# Patient Record
Sex: MALE | Race: BLACK OR AFRICAN AMERICAN | Employment: FULL TIME | URBAN - METROPOLITAN AREA
[De-identification: names, ages, dates, MRNs, and addresses within clinical notes are randomized per-mention and may not be internally consistent; named-entity substitution may affect disease eponyms.]

---

## 2017-01-12 ENCOUNTER — OFFICE VISIT (OUTPATIENT)
Dept: FAMILY MEDICINE CLINIC | Age: 62
End: 2017-01-12

## 2017-01-12 VITALS
DIASTOLIC BLOOD PRESSURE: 88 MMHG | OXYGEN SATURATION: 97 % | SYSTOLIC BLOOD PRESSURE: 147 MMHG | HEIGHT: 64 IN | RESPIRATION RATE: 18 BRPM | HEART RATE: 65 BPM | TEMPERATURE: 96.9 F | WEIGHT: 178.8 LBS | BODY MASS INDEX: 30.52 KG/M2

## 2017-01-12 DIAGNOSIS — Z11.3 SCREEN FOR STD (SEXUALLY TRANSMITTED DISEASE): ICD-10-CM

## 2017-01-12 DIAGNOSIS — R04.0 FREQUENT NOSEBLEEDS: ICD-10-CM

## 2017-01-12 DIAGNOSIS — I10 ESSENTIAL HYPERTENSION: Primary | ICD-10-CM

## 2017-01-12 DIAGNOSIS — E78.00 HIGH CHOLESTEROL: ICD-10-CM

## 2017-01-12 NOTE — PROGRESS NOTES
HISTORY OF PRESENT ILLNESS  Shani Quintero is a 59 y.o. male. HPI Pt. Comes in for blood pressure and cholesterol check. Has been having some problems with recurrent nose bleeds, saw Dr. Jewel Torres of ENT- had silver nitrate applied. This helped for one week. ROS    Physical Exam   Constitutional: He appears well-developed and well-nourished. HENT:   Right Ear: External ear normal.   Left Ear: External ear normal.   Mouth/Throat: Oropharynx is clear and moist.   Neck: No thyromegaly present. Cardiovascular: Normal rate, regular rhythm, normal heart sounds and intact distal pulses. Pulmonary/Chest: Effort normal and breath sounds normal. No respiratory distress. He has no wheezes. Abdominal: Soft. Bowel sounds are normal. He exhibits no distension and no mass. There is no tenderness. There is no guarding. Musculoskeletal: Normal range of motion. He exhibits no edema. Lymphadenopathy:     He has no cervical adenopathy. Nursing note and vitals reviewed. ASSESSMENT and PLAN  Orders Placed This Encounter    LIPID PANEL    METABOLIC PANEL, COMPREHENSIVE    HIV 1/2 AB SCREEN Hans Dumont was seen today for cholesterol problem and hypertension. Diagnoses and all orders for this visit:    Essential hypertension  -     Cancel: CBC WITH AUTOMATED DIFF  -     METABOLIC PANEL, COMPREHENSIVE    High cholesterol  -     LIPID PANEL    Frequent nosebleeds    Screen for STD (sexually transmitted disease)  -     HIV 1/2 AB SCREEN W RFLX CONFIRM      Follow-up Disposition:  Return in about 6 months (around 7/12/2017).

## 2017-01-12 NOTE — PROGRESS NOTES
Chief Complaint   Patient presents with    Cholesterol Problem    Hypertension     Pt here for 6 month follow up. Pt went to ENT in December for nose bleeds. Given nitrate to stop bleeding , only lasted a few days then nose bleeds started again. Pt reports he is supposed to go back again. Pt reported having colonoscopy done in September ,2016.

## 2017-01-12 NOTE — MR AVS SNAPSHOT
Visit Information Date & Time Provider Department Dept. Phone Encounter #  
 1/12/2017 10:45 AM Concepción Ambrose MD [facility] [id number] Upcoming Health Maintenance Date Due COLONOSCOPY 9/16/2015 DTaP/Tdap/Td series (2 - Td) 12/18/2024 Allergies as of 1/12/2017  Review Complete On: 1/12/2017 By: Kristen Odonnell LPN Severity Noted Reaction Type Reactions Other Medication  03/26/2010    Other (comments) oxepam  Patient states he doesn't know what this is. Current Immunizations  Reviewed on 12/18/2014 Name Date Tdap 12/18/2014 Not reviewed this visit You Were Diagnosed With   
  
 Codes Comments Essential hypertension    -  Primary ICD-10-CM: I10 
ICD-9-CM: 401.9 High cholesterol     ICD-10-CM: E78.00 ICD-9-CM: 272.0 Frequent nosebleeds     ICD-10-CM: R04.0 ICD-9-CM: 784.7 Screen for STD (sexually transmitted disease)     ICD-10-CM: Z11.3 ICD-9-CM: V74.5 Vitals BP Pulse Temp Resp Height(growth percentile) Weight(growth percentile) 147/88 65 96.9 °F (36.1 °C) (Oral) 18 5' 4\" (1.626 m) 178 lb 12.8 oz (81.1 kg) SpO2 BMI Smoking Status 97% 30.69 kg/m2 Former Smoker BMI and BSA Data Body Mass Index Body Surface Area  
 30.69 kg/m 2 1.91 m 2 Preferred Pharmacy Pharmacy Name Phone [facility] [address] [phone] Your Updated Medication List  
  
   
This list is accurate as of: 1/12/17 11:20 AM.  Always use your most recent med list.  
  
  
  
  
 ALPRAZolam 1 mg tablet Commonly known as:  XANAX  
TAKE 1 TABLET BY MOUTH TWICE DAILY  
  
 hydroCHLOROthiazide 25 mg tablet Commonly known as:  HYDRODIURIL  
TAKE 1 TABLET BY MOUTH DAILY FOR BLOOD PRESSURE  
  
 naproxen 500 mg tablet Commonly known as:  NAPROSYN  
 TAKE 1 TABLET BY MOUTH 3 times  DAILY WITH FOOD FOR HIP PAIN  
  
 nisoldipine 40 mg SR tablet TAKE 1 TABLET BY MOUTH DAILY FOR BLOOD PRESSURE potassium chloride 20 mEq tablet Commonly known as:  K-DUR, KLOR-CON  
TAKE 1 TABLET BY MOUTH TWICE DAILY  
  
 simvastatin 10 mg tablet Commonly known as:  ZOCOR  
TAKE 1 TABLET BY MOUTH AT BEDTIME FOR CHOLESTEROL  
  
 traMADol-acetaminophen 37.5-325 mg per tablet Commonly known as:  ULTRACET TAKE 2 TABLETS BY MOUTH EVERY 6 HOURS AS NEEDED FOR PAIN  
  
 triamcinolone acetonide 0.1 % topical cream  
Commonly known as:  KENALOG  
APPLY A THIN LAYER TO THE AFFECTED AREA TWICE DAILY We Performed the Following HIV 1/2 Delcia Abbot RFLX CONFIRM [75166 CPT(R)] LIPID PANEL [51851 CPT(R)] METABOLIC PANEL, COMPREHENSIVE [48486 CPT(R)] Introducing [facility] & HEALTH SERVICES! Dear Dutch Francis: Thank you for requesting a [employer] account. Our records indicate that you have previously registered for a [employer] account but its currently inactive. Please call our [employer] support line at [phone]. Additional Information If you have questions, please visit the Frequently Asked Questions section of the [employer] website at [email or web address] Remember, [employer] is NOT to be used for urgent needs. For medical emergencies, dial 911. Now available from your iPhone and Android! Please provide this summary of care documentation to your next provider. Your primary care clinician is listed as Dank Paredes. If you have any questions after today's visit, please call [phone].

## 2017-01-13 LAB
ALBUMIN SERPL-MCNC: 4.8 G/DL (ref 3.6–4.8)
ALBUMIN/GLOB SERPL: 1.7 {RATIO} (ref 1.1–2.5)
ALP SERPL-CCNC: 63 IU/L (ref 39–117)
ALT SERPL-CCNC: 29 IU/L (ref 0–44)
AST SERPL-CCNC: 28 IU/L (ref 0–40)
BILIRUB SERPL-MCNC: 0.4 MG/DL (ref 0–1.2)
BUN SERPL-MCNC: 13 MG/DL (ref 8–27)
BUN/CREAT SERPL: 13 (ref 10–22)
CALCIUM SERPL-MCNC: 9.6 MG/DL (ref 8.6–10.2)
CHLORIDE SERPL-SCNC: 101 MMOL/L (ref 96–106)
CHOLEST SERPL-MCNC: 152 MG/DL (ref 100–199)
CO2 SERPL-SCNC: 27 MMOL/L (ref 18–29)
CREAT SERPL-MCNC: 1.03 MG/DL (ref 0.76–1.27)
GLOBULIN SER CALC-MCNC: 2.8 G/DL (ref 1.5–4.5)
GLUCOSE SERPL-MCNC: 95 MG/DL (ref 65–99)
HDLC SERPL-MCNC: 52 MG/DL
INTERPRETATION, 910389: NORMAL
LDLC SERPL CALC-MCNC: 90 MG/DL (ref 0–99)
POTASSIUM SERPL-SCNC: 4.2 MMOL/L (ref 3.5–5.2)
PROT SERPL-MCNC: 7.6 G/DL (ref 6–8.5)
SODIUM SERPL-SCNC: 142 MMOL/L (ref 134–144)
TRIGL SERPL-MCNC: 52 MG/DL (ref 0–149)
VLDLC SERPL CALC-MCNC: 10 MG/DL (ref 5–40)

## 2017-02-02 RX ORDER — ALPRAZOLAM 1 MG/1
TABLET ORAL
Qty: 60 TAB | Refills: 0 | Status: SHIPPED | OUTPATIENT
Start: 2017-02-02 | End: 2017-03-03 | Stop reason: SDUPTHER

## 2017-02-03 ENCOUNTER — DOCUMENTATION ONLY (OUTPATIENT)
Dept: FAMILY MEDICINE CLINIC | Age: 62
End: 2017-02-03

## 2017-02-07 RX ORDER — TRAMADOL HYDROCHLORIDE AND ACETAMINOPHEN 37.5; 325 MG/1; MG/1
TABLET ORAL
Qty: 240 TAB | Refills: 0 | Status: SHIPPED | OUTPATIENT
Start: 2017-02-07 | End: 2017-03-23 | Stop reason: SDUPTHER

## 2017-02-08 ENCOUNTER — TELEPHONE (OUTPATIENT)
Dept: FAMILY MEDICINE CLINIC | Age: 62
End: 2017-02-08

## 2017-03-02 RX ORDER — SIMVASTATIN 10 MG/1
TABLET, FILM COATED ORAL
Qty: 90 TAB | Refills: 0 | Status: SHIPPED | OUTPATIENT
Start: 2017-03-02 | End: 2017-07-12 | Stop reason: SDUPTHER

## 2017-03-02 RX ORDER — HYDROCHLOROTHIAZIDE 25 MG/1
TABLET ORAL
Qty: 90 TAB | Refills: 0 | Status: SHIPPED | OUTPATIENT
Start: 2017-03-02 | End: 2017-07-12 | Stop reason: SDUPTHER

## 2017-03-03 RX ORDER — ALPRAZOLAM 1 MG/1
TABLET ORAL
Qty: 60 TAB | Refills: 0 | Status: SHIPPED | OUTPATIENT
Start: 2017-03-03 | End: 2017-03-06 | Stop reason: SDUPTHER

## 2017-03-06 ENCOUNTER — DOCUMENTATION ONLY (OUTPATIENT)
Dept: FAMILY MEDICINE CLINIC | Age: 62
End: 2017-03-06

## 2017-03-06 RX ORDER — ALPRAZOLAM 1 MG/1
TABLET ORAL
Qty: 60 TAB | Refills: 0 | Status: SHIPPED | OUTPATIENT
Start: 2017-03-06 | End: 2017-05-01 | Stop reason: SDUPTHER

## 2017-03-07 ENCOUNTER — TELEPHONE (OUTPATIENT)
Dept: FAMILY MEDICINE CLINIC | Age: 62
End: 2017-03-07

## 2017-03-09 RX ORDER — POTASSIUM CHLORIDE 20 MEQ/1
TABLET, EXTENDED RELEASE ORAL
Qty: 60 TAB | Refills: 0 | Status: SHIPPED | OUTPATIENT
Start: 2017-03-09 | End: 2017-07-12 | Stop reason: SDUPTHER

## 2017-03-23 RX ORDER — TRAMADOL HYDROCHLORIDE AND ACETAMINOPHEN 37.5; 325 MG/1; MG/1
TABLET ORAL
Qty: 240 TAB | Refills: 0 | Status: SHIPPED | OUTPATIENT
Start: 2017-03-23 | End: 2017-05-02 | Stop reason: SDUPTHER

## 2017-03-27 ENCOUNTER — TELEPHONE (OUTPATIENT)
Dept: FAMILY MEDICINE CLINIC | Age: 62
End: 2017-03-27

## 2017-03-27 NOTE — TELEPHONE ENCOUNTER
Patient wants to get the medication for traMADol-acetaminophen (ULTRACET) 37.5-325 mg per tablet .  If any questions please give him a call @ [phone]

## 2017-04-06 RX ORDER — POTASSIUM CHLORIDE 20 MEQ/1
TABLET, EXTENDED RELEASE ORAL
Qty: 60 TAB | Refills: 0 | Status: SHIPPED | OUTPATIENT
Start: 2017-04-06 | End: 2017-07-12 | Stop reason: SDUPTHER

## 2017-05-01 RX ORDER — ALPRAZOLAM 1 MG/1
TABLET ORAL
Qty: 60 TAB | Refills: 0 | Status: SHIPPED | OUTPATIENT
Start: 2017-05-01 | End: 2017-05-02 | Stop reason: SDUPTHER

## 2017-05-02 ENCOUNTER — DOCUMENTATION ONLY (OUTPATIENT)
Dept: FAMILY MEDICINE CLINIC | Age: 62
End: 2017-05-02

## 2017-05-02 RX ORDER — TRAMADOL HYDROCHLORIDE AND ACETAMINOPHEN 37.5; 325 MG/1; MG/1
TABLET ORAL
Qty: 240 TAB | Refills: 0 | Status: SHIPPED | OUTPATIENT
Start: 2017-05-02 | End: 2017-06-20 | Stop reason: SDUPTHER

## 2017-05-02 RX ORDER — ALPRAZOLAM 1 MG/1
TABLET ORAL
Qty: 60 TAB | Refills: 0 | Status: SHIPPED | OUTPATIENT
Start: 2017-05-02 | End: 2017-07-02 | Stop reason: SDUPTHER

## 2017-05-08 RX ORDER — POTASSIUM CHLORIDE 20 MEQ/1
TABLET, EXTENDED RELEASE ORAL
Qty: 60 TAB | Refills: 0 | Status: SHIPPED | OUTPATIENT
Start: 2017-05-08 | End: 2017-07-12 | Stop reason: ALTCHOICE

## 2017-05-30 RX ORDER — SIMVASTATIN 10 MG/1
TABLET, FILM COATED ORAL
Qty: 90 TAB | Refills: 0 | Status: SHIPPED | OUTPATIENT
Start: 2017-05-30 | End: 2017-07-12 | Stop reason: SDUPTHER

## 2017-05-30 RX ORDER — HYDROCHLOROTHIAZIDE 25 MG/1
TABLET ORAL
Qty: 90 TAB | Refills: 0 | Status: SHIPPED | OUTPATIENT
Start: 2017-05-30 | End: 2017-07-12 | Stop reason: SDUPTHER

## 2017-06-16 RX ORDER — POTASSIUM CHLORIDE 20 MEQ/1
TABLET, EXTENDED RELEASE ORAL
Qty: 60 TAB | Refills: 0 | Status: SHIPPED | OUTPATIENT
Start: 2017-06-16 | End: 2017-07-12 | Stop reason: SDUPTHER

## 2017-06-20 RX ORDER — TRAMADOL HYDROCHLORIDE AND ACETAMINOPHEN 37.5; 325 MG/1; MG/1
TABLET ORAL
Qty: 240 TAB | Refills: 0 | Status: SHIPPED | OUTPATIENT
Start: 2017-06-20 | End: 2017-07-12 | Stop reason: ALTCHOICE

## 2017-06-20 RX ORDER — POTASSIUM CHLORIDE 20 MEQ/1
TABLET, EXTENDED RELEASE ORAL
Qty: 60 TAB | Refills: 0 | Status: SHIPPED | OUTPATIENT
Start: 2017-06-20 | End: 2017-07-12 | Stop reason: SDUPTHER

## 2017-06-22 ENCOUNTER — TELEPHONE (OUTPATIENT)
Dept: FAMILY MEDICINE CLINIC | Age: 62
End: 2017-06-22

## 2017-07-03 RX ORDER — ALPRAZOLAM 1 MG/1
TABLET ORAL
Qty: 60 TAB | Refills: 0 | Status: SHIPPED | OUTPATIENT
Start: 2017-07-03 | End: 2017-07-04 | Stop reason: SDUPTHER

## 2017-07-05 ENCOUNTER — TELEPHONE (OUTPATIENT)
Dept: FAMILY MEDICINE CLINIC | Age: 62
End: 2017-07-05

## 2017-07-05 RX ORDER — ALPRAZOLAM 1 MG/1
TABLET ORAL
Qty: 60 TAB | Refills: 0 | Status: SHIPPED | OUTPATIENT
Start: 2017-07-05 | End: 2017-07-12 | Stop reason: SDUPTHER

## 2017-07-06 ENCOUNTER — TELEPHONE (OUTPATIENT)
Dept: FAMILY MEDICINE CLINIC | Age: 62
End: 2017-07-06

## 2017-07-12 ENCOUNTER — OFFICE VISIT (OUTPATIENT)
Dept: FAMILY MEDICINE CLINIC | Age: 62
End: 2017-07-12

## 2017-07-12 VITALS
OXYGEN SATURATION: 98 % | SYSTOLIC BLOOD PRESSURE: 143 MMHG | DIASTOLIC BLOOD PRESSURE: 72 MMHG | HEART RATE: 65 BPM | HEIGHT: 64 IN | BODY MASS INDEX: 28.79 KG/M2 | WEIGHT: 168.6 LBS | TEMPERATURE: 98.6 F | RESPIRATION RATE: 14 BRPM

## 2017-07-12 DIAGNOSIS — I10 ESSENTIAL HYPERTENSION: ICD-10-CM

## 2017-07-12 DIAGNOSIS — Z11.4 SCREENING FOR HIV (HUMAN IMMUNODEFICIENCY VIRUS): ICD-10-CM

## 2017-07-12 DIAGNOSIS — Z12.5 PROSTATE CANCER SCREENING: ICD-10-CM

## 2017-07-12 DIAGNOSIS — E78.00 HIGH CHOLESTEROL: Primary | ICD-10-CM

## 2017-07-12 RX ORDER — TRIAMCINOLONE ACETONIDE 1 MG/G
CREAM TOPICAL
Qty: 80 G | Refills: 0 | Status: CANCELLED | OUTPATIENT
Start: 2017-07-12

## 2017-07-12 RX ORDER — ALPRAZOLAM 1 MG/1
TABLET ORAL
Qty: 60 TAB | Refills: 5 | Status: SHIPPED | OUTPATIENT
Start: 2017-07-12 | End: 2018-01-08 | Stop reason: SDUPTHER

## 2017-07-12 RX ORDER — SIMVASTATIN 10 MG/1
TABLET, FILM COATED ORAL
Qty: 90 TAB | Refills: 3 | Status: SHIPPED | OUTPATIENT
Start: 2017-07-12 | End: 2018-01-15 | Stop reason: SDUPTHER

## 2017-07-12 RX ORDER — HYDROCHLOROTHIAZIDE 25 MG/1
TABLET ORAL
Qty: 90 TAB | Refills: 3 | Status: SHIPPED | OUTPATIENT
Start: 2017-07-12 | End: 2018-08-08 | Stop reason: SDUPTHER

## 2017-07-12 RX ORDER — POTASSIUM CHLORIDE 20 MEQ/1
TABLET, EXTENDED RELEASE ORAL
Qty: 60 TAB | Refills: 12 | Status: SHIPPED | OUTPATIENT
Start: 2017-07-12 | End: 2018-01-08 | Stop reason: SDUPTHER

## 2017-07-12 RX ORDER — TRAMADOL HYDROCHLORIDE AND ACETAMINOPHEN 37.5; 325 MG/1; MG/1
TABLET ORAL
Qty: 240 TAB | Refills: 0 | Status: CANCELLED | OUTPATIENT
Start: 2017-07-12

## 2017-07-12 RX ORDER — DEXTROMETHORPHAN HYDROBROMIDE, GUAIFENESIN 5; 100 MG/5ML; MG/5ML
LIQUID ORAL
Qty: 120 TAB | Refills: 5
Start: 2017-07-12 | End: 2018-01-08

## 2017-07-12 NOTE — MR AVS SNAPSHOT
Visit Information Date & Time Provider Department Dept. Phone Encounter #  
 7/12/2017  9:30 AM Candance Ngo, MD [facility] [phone] [id number] Follow-up Instructions Return in about 6 months (around 1/12/2018). Upcoming Health Maintenance Date Due COLONOSCOPY 9/16/2015 INFLUENZA AGE 9 TO ADULT 8/1/2017 DTaP/Tdap/Td series (2 - Td) 12/18/2024 Allergies as of 7/12/2017  Review Complete On: 7/12/2017 By: Candance Ngo, MD  
  
 Severity Noted Reaction Type Reactions Naproxen  07/12/2017    Other (comments)  
 nosebleeds Other Medication  03/26/2010    Other (comments) oxepam  Patient states he doesn't know what this is. Current Immunizations  Reviewed on 12/18/2014 Name Date Tdap 12/18/2014 Not reviewed this visit You Were Diagnosed With   
  
 Codes Comments High cholesterol    -  Primary ICD-10-CM: E78.00 ICD-9-CM: 272.0 Prostate cancer screening     ICD-10-CM: Z12.5 ICD-9-CM: V76.44 Screening for HIV (human immunodeficiency virus)     ICD-10-CM: Z11.4 ICD-9-CM: V73.89 Essential hypertension     ICD-10-CM: I10 
ICD-9-CM: 401.9 Vitals BP Pulse Temp Resp Height(growth percentile) Weight(growth percentile) 143/72 65 98.6 °F (37 °C) (Oral) 14 5' 4\" (1.626 m) 168 lb 9.6 oz (76.5 kg) SpO2 BMI Smoking Status 98% 28.94 kg/m2 Former Smoker Vitals History BMI and BSA Data Body Mass Index Body Surface Area  
 28.94 kg/m 2 1.86 m 2 Preferred Pharmacy Pharmacy Name Phone [facility] [address] [phone] Your Updated Medication List  
  
   
This list is accurate as of: 7/12/17 10:42 AM.  Always use your most recent med list.  
  
  
  
  
 acetaminophen 650 mg CR tablet Commonly known as:  TYLENOL ARTHRITIS PAIN  
2 tabs po twice daily for pain ALPRAZolam 1 mg tablet Commonly known as:  XANAX  
TAKE 1 TABLET BY MOUTH TWICE DAILY  
  
 hydroCHLOROthiazide 25 mg tablet Commonly known as:  HYDRODIURIL  
TAKE 1 TABLET BY MOUTH DAILY FOR BLOOD PRESSURE  
  
 nisoldipine 40 mg SR tablet TAKE 1 TABLET BY MOUTH DAILY FOR BLOOD PRESSURE potassium chloride 20 mEq tablet Commonly known as:  K-DUR, KLOR-CON  
TAKE 1 TABLET BY MOUTH TWICE DAILY  
  
 simvastatin 10 mg tablet Commonly known as:  ZOCOR  
TAKE 1 TABLET BY MOUTH AT BEDTIME FOR CHOLESTEROL  
  
 triamcinolone acetonide 0.1 % topical cream  
Commonly known as:  KENALOG  
APPLY A THIN LAYER TO THE AFFECTED AREA TWICE DAILY Prescriptions Printed Refills ALPRAZolam (XANAX) 1 mg tablet 5 Sig: TAKE 1 TABLET BY MOUTH TWICE DAILY Class: Print Prescriptions Sent to Pharmacy Refills  
 simvastatin (ZOCOR) 10 mg tablet 3 Sig: TAKE 1 TABLET BY MOUTH AT BEDTIME FOR CHOLESTEROL Class: Normal  
 Pharmacy: [address] Ph #: [phone]  
 potassium chloride (K-DUR, KLOR-CON) 20 mEq tablet 12 Sig: TAKE 1 TABLET BY MOUTH TWICE DAILY Class: Normal  
 Pharmacy: [address] Ph #: [phone]  
 hydroCHLOROthiazide (HYDRODIURIL) 25 mg tablet 3 Sig: TAKE 1 TABLET BY MOUTH DAILY FOR BLOOD PRESSURE Class: Normal  
 Pharmacy: [address] Ph #: [phone] We Performed the Following CBC WITH AUTOMATED DIFF [56336 CPT(R)] HIV 1/2 AG/AB, 4TH GENERATION,W RFLX CONFIRM [[id number] Custom] LIPID PANEL [49791 CPT(R)] METABOLIC PANEL, COMPREHENSIVE [95596 CPT(R)] PROSTATE SPECIFIC AG (PSA) [id number] CPT(R)] Follow-up Instructions Return in about 6 months (around 1/12/2018). Introducing [facility] & HEALTH SERVICES! Dear Zeke Reich: Thank you for requesting a [employer] account. Our records indicate that you have previously registered for a [employer] account but its currently inactive. Please call our [employer] support line at [phone]. Additional Information If you have questions, please visit the Frequently Asked Questions section of the [employer] website at [email or web address] Remember, [employer] is NOT to be used for urgent needs. For medical emergencies, dial 911. Now available from your iPhone and Android! Please provide this summary of care documentation to your next provider. Your primary care clinician is listed as Janette Reinoso. If you have any questions after today's visit, please call [phone].

## 2017-07-12 NOTE — PROGRESS NOTES
Chief Complaint   Patient presents with    Hypertension    Cholesterol Problem     1. Have you been to the ER, urgent care clinic since your last visit? Hospitalized since your last visit? No    2. Have you seen or consulted any other health care providers outside of the [address] [place] [address] since your last visit? Include any pap smears or colon screening.  No   Health Maintenance Due   Topic Date Due    COLONOSCOPY  09/16/2015

## 2017-07-12 NOTE — PROGRESS NOTES
HISTORY OF PRESENT ILLNESS  Connie Reed is a 57 y.o. male. HPI Pt. Comes in for blood pressure and cholesterol check. Still working. No complaints of chest pain, shortness of breath, TIAs, claudication or edema. ROS    Physical Exam   Constitutional: He appears well-developed and well-nourished. HENT:   Right Ear: External ear normal.   Left Ear: External ear normal.   Mouth/Throat: Oropharynx is clear and moist.   Neck: No thyromegaly present. Cardiovascular: Normal rate, regular rhythm, normal heart sounds and intact distal pulses. Pulmonary/Chest: Effort normal and breath sounds normal. No respiratory distress. He has no wheezes. Abdominal: Soft. Bowel sounds are normal. He exhibits no distension and no mass. There is no tenderness. There is no guarding. Musculoskeletal: Normal range of motion. He exhibits no edema. Lymphadenopathy:     He has no cervical adenopathy. Nursing note and vitals reviewed. ASSESSMENT and PLAN  Orders Placed This Encounter    CBC WITH AUTOMATED DIFF    METABOLIC PANEL, COMPREHENSIVE    LIPID PANEL    PROSTATE SPECIFIC AG    HIV 1/2 AG/AB, 4TH GENERATION,W RFLX CONFIRM    simvastatin (ZOCOR) 10 mg tablet    potassium chloride (K-DUR, KLOR-CON) 20 mEq tablet    hydroCHLOROthiazide (HYDRODIURIL) 25 mg tablet    ALPRAZolam (XANAX) 1 mg tablet    acetaminophen (TYLENOL ARTHRITIS PAIN) 650 mg CR tablet     Luisana Morrison was seen today for hypertension and cholesterol problem.     Diagnoses and all orders for this visit:    High cholesterol  -     CBC WITH AUTOMATED DIFF  -     METABOLIC PANEL, COMPREHENSIVE  -     LIPID PANEL    Prostate cancer screening  -     PROSTATE SPECIFIC AG    Screening for HIV (human immunodeficiency virus)  -     HIV 1/2 AG/AB, 4TH GENERATION,W RFLX CONFIRM    Essential hypertension    Other orders  -     Cancel: triamcinolone acetonide (KENALOG) 0.1 % topical cream; use thin layer  -     Cancel: traMADol-acetaminophen (ULTRACET) 37.5-325 mg per tablet;   -     simvastatin (ZOCOR) 10 mg tablet; TAKE 1 TABLET BY MOUTH AT BEDTIME FOR CHOLESTEROL  -     potassium chloride (K-DUR, KLOR-CON) 20 mEq tablet; TAKE 1 TABLET BY MOUTH TWICE DAILY  -     hydroCHLOROthiazide (HYDRODIURIL) 25 mg tablet; TAKE 1 TABLET BY MOUTH DAILY FOR BLOOD PRESSURE  -     ALPRAZolam (XANAX) 1 mg tablet; TAKE 1 TABLET BY MOUTH TWICE DAILY  -     acetaminophen (TYLENOL ARTHRITIS PAIN) 650 mg CR tablet; 2 tabs po twice daily for pain      Follow-up Disposition:  Return in about 6 months (around 1/12/2018).

## 2017-07-13 LAB
ALBUMIN SERPL-MCNC: 4.9 G/DL (ref 3.6–4.8)
ALBUMIN/GLOB SERPL: 1.6 {RATIO} (ref 1.2–2.2)
ALP SERPL-CCNC: 71 IU/L (ref 39–117)
ALT SERPL-CCNC: 25 IU/L (ref 0–44)
AST SERPL-CCNC: 29 IU/L (ref 0–40)
BASOPHILS # BLD AUTO: 0 X10E3/UL (ref 0–0.2)
BASOPHILS NFR BLD AUTO: 0 %
BILIRUB SERPL-MCNC: 0.6 MG/DL (ref 0–1.2)
BUN SERPL-MCNC: 16 MG/DL (ref 8–27)
BUN/CREAT SERPL: 16 (ref 10–24)
CALCIUM SERPL-MCNC: 10 MG/DL (ref 8.6–10.2)
CHLORIDE SERPL-SCNC: 97 MMOL/L (ref 96–106)
CHOLEST SERPL-MCNC: 159 MG/DL (ref 100–199)
CO2 SERPL-SCNC: 26 MMOL/L (ref 18–29)
CREAT SERPL-MCNC: 1.02 MG/DL (ref 0.76–1.27)
EOSINOPHIL # BLD AUTO: 0.1 X10E3/UL (ref 0–0.4)
EOSINOPHIL NFR BLD AUTO: 3 %
ERYTHROCYTE [DISTWIDTH] IN BLOOD BY AUTOMATED COUNT: 13.8 % (ref 12.3–15.4)
GLOBULIN SER CALC-MCNC: 3.1 G/DL (ref 1.5–4.5)
GLUCOSE SERPL-MCNC: 115 MG/DL (ref 65–99)
HCT VFR BLD AUTO: 43.7 % (ref 37.5–51)
HDLC SERPL-MCNC: 61 MG/DL
HGB BLD-MCNC: 14.6 G/DL (ref 12.6–17.7)
HIV 1+2 AB+HIV1 P24 AG SERPL QL IA: NON REACTIVE
IMM GRANULOCYTES # BLD: 0 X10E3/UL (ref 0–0.1)
IMM GRANULOCYTES NFR BLD: 0 %
INTERPRETATION, 910389: NORMAL
LDLC SERPL CALC-MCNC: 90 MG/DL (ref 0–99)
LYMPHOCYTES # BLD AUTO: 1.5 X10E3/UL (ref 0.7–3.1)
LYMPHOCYTES NFR BLD AUTO: 31 %
MCH RBC QN AUTO: 29.1 PG (ref 26.6–33)
MCHC RBC AUTO-ENTMCNC: 33.4 G/DL (ref 31.5–35.7)
MCV RBC AUTO: 87 FL (ref 79–97)
MONOCYTES # BLD AUTO: 0.3 X10E3/UL (ref 0.1–0.9)
MONOCYTES NFR BLD AUTO: 7 %
NEUTROPHILS # BLD AUTO: 2.8 X10E3/UL (ref 1.4–7)
NEUTROPHILS NFR BLD AUTO: 59 %
PLATELET # BLD AUTO: 273 X10E3/UL (ref 150–379)
POTASSIUM SERPL-SCNC: 4.8 MMOL/L (ref 3.5–5.2)
PROT SERPL-MCNC: 8 G/DL (ref 6–8.5)
PSA SERPL-MCNC: 5 NG/ML (ref 0–4)
RBC # BLD AUTO: 5.02 X10E6/UL (ref 4.14–5.8)
SODIUM SERPL-SCNC: 140 MMOL/L (ref 134–144)
TRIGL SERPL-MCNC: 40 MG/DL (ref 0–149)
VLDLC SERPL CALC-MCNC: 8 MG/DL (ref 5–40)
WBC # BLD AUTO: 4.8 X10E3/UL (ref 3.4–10.8)

## 2017-08-10 RX ORDER — POTASSIUM CHLORIDE 20 MEQ/1
TABLET, EXTENDED RELEASE ORAL
Qty: 60 TAB | Refills: 0 | Status: SHIPPED | OUTPATIENT
Start: 2017-08-10 | End: 2018-01-08 | Stop reason: SDUPTHER

## 2017-09-11 RX ORDER — HYDROCHLOROTHIAZIDE 25 MG/1
TABLET ORAL
Qty: 90 TAB | Refills: 0 | Status: SHIPPED | OUTPATIENT
Start: 2017-09-11 | End: 2018-01-08 | Stop reason: ALTCHOICE

## 2017-09-11 RX ORDER — SIMVASTATIN 10 MG/1
TABLET, FILM COATED ORAL
Qty: 90 TAB | Refills: 0 | Status: SHIPPED | OUTPATIENT
Start: 2017-09-11 | End: 2018-01-08 | Stop reason: SDUPTHER

## 2017-12-12 RX ORDER — NISOLDIPINE 40 MG/1
TABLET, FILM COATED, EXTENDED RELEASE ORAL
Qty: 30 TAB | Refills: 0 | Status: SHIPPED | OUTPATIENT
Start: 2017-12-12 | End: 2018-01-08 | Stop reason: SDUPTHER

## 2018-01-08 ENCOUNTER — OFFICE VISIT (OUTPATIENT)
Dept: FAMILY MEDICINE CLINIC | Age: 63
End: 2018-01-08

## 2018-01-08 VITALS
HEIGHT: 64 IN | BODY MASS INDEX: 31.86 KG/M2 | TEMPERATURE: 96.8 F | RESPIRATION RATE: 14 BRPM | DIASTOLIC BLOOD PRESSURE: 77 MMHG | SYSTOLIC BLOOD PRESSURE: 140 MMHG | OXYGEN SATURATION: 96 % | WEIGHT: 186.6 LBS | HEART RATE: 105 BPM

## 2018-01-08 DIAGNOSIS — E78.00 HIGH CHOLESTEROL: ICD-10-CM

## 2018-01-08 DIAGNOSIS — F41.9 ANXIETY: ICD-10-CM

## 2018-01-08 DIAGNOSIS — I10 ESSENTIAL HYPERTENSION: Primary | ICD-10-CM

## 2018-01-08 RX ORDER — POTASSIUM CHLORIDE 20 MEQ/1
TABLET, EXTENDED RELEASE ORAL
Qty: 60 TAB | Refills: 12 | Status: SHIPPED | OUTPATIENT
Start: 2018-01-08 | End: 2019-01-08 | Stop reason: SDUPTHER

## 2018-01-08 RX ORDER — ALPRAZOLAM 1 MG/1
TABLET ORAL
Qty: 60 TAB | Refills: 5 | Status: SHIPPED | OUTPATIENT
Start: 2018-01-08 | End: 2018-07-30 | Stop reason: SDUPTHER

## 2018-01-08 RX ORDER — NISOLDIPINE 40 MG/1
TABLET, FILM COATED, EXTENDED RELEASE ORAL
Qty: 30 TAB | Refills: 12 | Status: SHIPPED | OUTPATIENT
Start: 2018-01-08 | End: 2018-08-08 | Stop reason: ALTCHOICE

## 2018-01-08 RX ORDER — CHOLECALCIFEROL (VITAMIN D3) 125 MCG
CAPSULE ORAL
COMMUNITY
End: 2018-08-08 | Stop reason: ALTCHOICE

## 2018-01-08 NOTE — PROGRESS NOTES
HISTORY OF PRESENT ILLNESS  Brisa Davis is a 57 y.o. male. HPI Pt. Comes in for blood pressure and cholesterol check. No complaints of chest pain, shortness of breath, TIAs, claudication or edema. Some R hip pain at times at site of hip replacement. Aleve- some relief. Due to insurance reasons had a prostate biopsy at [facility], but wont get the test results back for a few days. Past Medical History:   Diagnosis Date    Arthritis     High cholesterol 3/18/2010    HTN (hypertension) 3/18/2010    Insomnia     EYES PUFFY & BLOODSHOT ON AWAKENING AFTER FITFUL NIGHT 6/4/13    Other and unspecified symptoms and signs involving general sensations and perceptions     5-6 [address] Other ill-defined conditions(812.89)     R FEMUR FRACTURE AGE 10-PINS & TRACTION USED    Panic attacks 3/18/2010    Unspecified adverse effect of anesthesia     PT STATES HE DID NOT SLEEP DURING COLONOSCOPY-MEDS DID NOT SEDATE       Social History     Social History    Marital status: SINGLE     Spouse name: N/A    Number of children: N/A    Years of education: N/A     Social History Main Topics    Smoking status: Former Smoker     Packs/day: 1.00     Years: 20.00     Quit date: 6/13/1991    Smokeless tobacco: Never Used      Comment: 1991    Alcohol use Yes      Comment: occasionally    Drug use: No    Sexual activity: Yes     Birth control/ protection: None     Other Topics Concern    None     Social History Narrative    Works as  at [employer]. Lives alone. 2 grown children. Current Outpatient Prescriptions   Medication Sig Dispense Refill    naproxen sodium (ALEVE) 220 mg cap Take  by mouth.       potassium chloride (K-DUR, KLOR-CON) 20 mEq tablet TAKE 1 TABLET BY MOUTH TWICE DAILY 60 Tab 12    nisoldipine 40 mg SR tablet TAKE 1 TABLET BY MOUTH DAILY FOR BLOOD PRESSURE 30 Tab 12    ALPRAZolam (XANAX) 1 mg tablet TAKE 1 TABLET BY MOUTH TWICE DAILY 60 Tab 5    simvastatin (ZOCOR) 10 mg tablet TAKE 1 TABLET BY MOUTH AT BEDTIME FOR CHOLESTEROL 90 Tab 3    hydroCHLOROthiazide (HYDRODIURIL) 25 mg tablet TAKE 1 TABLET BY MOUTH DAILY FOR BLOOD PRESSURE 90 Tab 3    triamcinolone acetonide (KENALOG) 0.1 % topical cream APPLY A THIN LAYER TO THE AFFECTED AREA TWICE DAILY 80 g 0         ROS    Physical Exam   Constitutional: He appears well-developed and well-nourished. HENT:   Right Ear: External ear normal.   Left Ear: External ear normal.   Mouth/Throat: Oropharynx is clear and moist.   Neck: No thyromegaly present. Cardiovascular: Normal rate, regular rhythm, normal heart sounds and intact distal pulses. Pulmonary/Chest: Effort normal and breath sounds normal. No respiratory distress. He has no wheezes. Abdominal: Soft. Bowel sounds are normal. He exhibits no distension and no mass. There is no tenderness. There is no guarding. Musculoskeletal: Normal range of motion. He exhibits no edema. Lymphadenopathy:     He has no cervical adenopathy. Nursing note and vitals reviewed. ASSESSMENT and PLAN  Orders Placed This Encounter    CBC WITH AUTOMATED DIFF    METABOLIC PANEL, COMPREHENSIVE    LIPID PANEL    potassium chloride (K-DUR, KLOR-CON) 20 mEq tablet     Sig: TAKE 1 TABLET BY MOUTH TWICE DAILY     Dispense:  60 Tab     Refill:  12    nisoldipine 40 mg SR tablet     Sig: TAKE 1 TABLET BY MOUTH DAILY FOR BLOOD PRESSURE     Dispense:  30 Tab     Refill:  12    ALPRAZolam (XANAX) 1 mg tablet     Sig: TAKE 1 TABLET BY MOUTH TWICE DAILY     Dispense:  60 Tab     Refill:  5     Orders Placed This Encounter    CBC WITH AUTOMATED DIFF    METABOLIC PANEL, COMPREHENSIVE    LIPID PANEL    potassium chloride (K-DUR, KLOR-CON) 20 mEq tablet    nisoldipine 40 mg SR tablet    ALPRAZolam (XANAX) 1 mg tablet     Diagnoses and all orders for this visit:    1. Essential hypertension    2.  High cholesterol  -     CBC WITH AUTOMATED DIFF  -     METABOLIC PANEL, COMPREHENSIVE  -     LIPID PANEL    3. Anxiety  -     ALPRAZolam (XANAX) 1 mg tablet; TAKE 1 TABLET BY MOUTH TWICE DAILY    Other orders  -     potassium chloride (K-DUR, KLOR-CON) 20 mEq tablet; TAKE 1 TABLET BY MOUTH TWICE DAILY  -     nisoldipine 40 mg SR tablet; TAKE 1 TABLET BY MOUTH DAILY FOR BLOOD PRESSURE      Follow-up Disposition:  Return in about 6 months (around 7/8/2018).

## 2018-01-08 NOTE — PROGRESS NOTES
Chief Complaint   Patient presents with    Hypertension    Cholesterol Problem    Panic Attack     1. Have you been to the ER, urgent care clinic since your last visit? Hospitalized since your last visit? No    2. Have you seen or consulted any other health care providers outside of the [address] [place] [address] since your last visit? Include any pap smears or colon screening. No   [facility]   DEC 27th  Prostate biopsy   There are no preventive care reminders to display for this patient.

## 2018-01-08 NOTE — MR AVS SNAPSHOT
Visit Information Date & Time Provider Department Dept. Phone Encounter #  
 1/8/2018  8:45 AM Chris Boyd MD [facility] [phone] [id number] Follow-up Instructions Return in about 6 months (around 7/8/2018). Upcoming Health Maintenance Date Due DTaP/Tdap/Td series (2 - Td) 12/18/2024 COLONOSCOPY 1/8/2028 Allergies as of 1/8/2018  Review Complete On: 1/8/2018 By: Chris Boyd MD  
  
 Severity Noted Reaction Type Reactions Naproxen  07/12/2017    Other (comments)  
 nosebleeds Other Medication  03/26/2010    Other (comments) oxepam  Patient states he doesn't know what this is. Tylenol [Acetaminophen]  01/08/2018    Other (comments) Makes \"nose bleed\" and \"throat dry\" Current Immunizations  Reviewed on 12/18/2014 Name Date Tdap 12/18/2014 Not reviewed this visit You Were Diagnosed With   
  
 Codes Comments Essential hypertension    -  Primary ICD-10-CM: I10 
ICD-9-CM: 401.9 High cholesterol     ICD-10-CM: E78.00 ICD-9-CM: 272.0 Anxiety     ICD-10-CM: F41.9 ICD-9-CM: 300.00 Vitals BP Pulse Temp Resp Height(growth percentile) Weight(growth percentile) 140/77 (!) 105 96.8 °F (36 °C) (Oral) 14 5' 4\" (1.626 m) 186 lb 9.6 oz (84.6 kg) SpO2 BMI Smoking Status 96% 32.03 kg/m2 Former Smoker Vitals History BMI and BSA Data Body Mass Index Body Surface Area 32.03 kg/m 2 1.95 m 2 Preferred Pharmacy Pharmacy Name Phone [facility], [address] [place] [address] [phone] Your Updated Medication List  
  
   
This list is accurate as of: 1/8/18  9:27 AM.  Always use your most recent med list.  
  
  
  
  
 ALEVE 220 mg Cap Generic drug:  naproxen sodium Take  by mouth. ALPRAZolam 1 mg tablet Commonly known as:  XANAX  
TAKE 1 TABLET BY MOUTH TWICE DAILY hydroCHLOROthiazide 25 mg tablet Commonly known as:  HYDRODIURIL  
TAKE 1 TABLET BY MOUTH DAILY FOR BLOOD PRESSURE  
  
 nisoldipine 40 mg SR tablet TAKE 1 TABLET BY MOUTH DAILY FOR BLOOD PRESSURE potassium chloride 20 mEq tablet Commonly known as:  K-DUR, KLOR-CON  
TAKE 1 TABLET BY MOUTH TWICE DAILY  
  
 simvastatin 10 mg tablet Commonly known as:  ZOCOR  
TAKE 1 TABLET BY MOUTH AT BEDTIME FOR CHOLESTEROL  
  
 triamcinolone acetonide 0.1 % topical cream  
Commonly known as:  KENALOG  
APPLY A THIN LAYER TO THE AFFECTED AREA TWICE DAILY Prescriptions Printed Refills ALPRAZolam (XANAX) 1 mg tablet 5 Sig: TAKE 1 TABLET BY MOUTH TWICE DAILY Class: Print Prescriptions Sent to Pharmacy Refills  
 potassium chloride (K-DUR, KLOR-CON) 20 mEq tablet 12 Sig: TAKE 1 TABLET BY MOUTH TWICE DAILY Class: Normal  
 Pharmacy: [address] Ph #: [phone]  
 nisoldipine 40 mg SR tablet 12 Sig: TAKE 1 TABLET BY MOUTH DAILY FOR BLOOD PRESSURE Class: Normal  
 Pharmacy: [address] Ph #: [phone] We Performed the Following CBC WITH AUTOMATED DIFF [78248 CPT(R)] LIPID PANEL [71235 CPT(R)] METABOLIC PANEL, COMPREHENSIVE [05787 CPT(R)] Follow-up Instructions Return in about 6 months (around 7/8/2018). Introducing [facility] & HEALTH SERVICES! Dear James Hsu: Thank you for requesting a [employer] account. Our records indicate that you have previously registered for a [employer] account but its currently inactive. Please call our [employer] support line at [phone]. Additional Information If you have questions, please visit the Frequently Asked Questions section of the [employer] website at [email or web address] Remember, [employer] is NOT to be used for urgent needs. For medical emergencies, dial 911. Now available from your iPhone and Android! Please provide this summary of care documentation to your next provider. Your primary care clinician is listed as Shai Query. If you have any questions after today's visit, please call [phone].

## 2018-01-09 LAB
ALBUMIN SERPL-MCNC: 4.7 G/DL (ref 3.6–4.8)
ALBUMIN/GLOB SERPL: 1.5 {RATIO} (ref 1.2–2.2)
ALP SERPL-CCNC: 61 IU/L (ref 39–117)
ALT SERPL-CCNC: 19 IU/L (ref 0–44)
AST SERPL-CCNC: 18 IU/L (ref 0–40)
BASOPHILS # BLD AUTO: 0 X10E3/UL (ref 0–0.2)
BASOPHILS NFR BLD AUTO: 1 %
BILIRUB SERPL-MCNC: 0.5 MG/DL (ref 0–1.2)
BUN SERPL-MCNC: 22 MG/DL (ref 8–27)
BUN/CREAT SERPL: 20 (ref 10–24)
CALCIUM SERPL-MCNC: 9.8 MG/DL (ref 8.6–10.2)
CHLORIDE SERPL-SCNC: 101 MMOL/L (ref 96–106)
CHOLEST SERPL-MCNC: 176 MG/DL (ref 100–199)
CO2 SERPL-SCNC: 27 MMOL/L (ref 18–29)
CREAT SERPL-MCNC: 1.1 MG/DL (ref 0.76–1.27)
EOSINOPHIL # BLD AUTO: 0.4 X10E3/UL (ref 0–0.4)
EOSINOPHIL NFR BLD AUTO: 6 %
ERYTHROCYTE [DISTWIDTH] IN BLOOD BY AUTOMATED COUNT: 14.2 % (ref 12.3–15.4)
GLOBULIN SER CALC-MCNC: 3.1 G/DL (ref 1.5–4.5)
GLUCOSE SERPL-MCNC: 110 MG/DL (ref 65–99)
HCT VFR BLD AUTO: 45.5 % (ref 37.5–51)
HDLC SERPL-MCNC: 54 MG/DL
HGB BLD-MCNC: 15.9 G/DL (ref 13–17.7)
IMM GRANULOCYTES # BLD: 0 X10E3/UL (ref 0–0.1)
IMM GRANULOCYTES NFR BLD: 0 %
INTERPRETATION, 910389: NORMAL
LDLC SERPL CALC-MCNC: 100 MG/DL (ref 0–99)
LYMPHOCYTES # BLD AUTO: 1.9 X10E3/UL (ref 0.7–3.1)
LYMPHOCYTES NFR BLD AUTO: 32 %
MCH RBC QN AUTO: 30.2 PG (ref 26.6–33)
MCHC RBC AUTO-ENTMCNC: 34.9 G/DL (ref 31.5–35.7)
MCV RBC AUTO: 87 FL (ref 79–97)
MONOCYTES # BLD AUTO: 0.3 X10E3/UL (ref 0.1–0.9)
MONOCYTES NFR BLD AUTO: 6 %
NEUTROPHILS # BLD AUTO: 3.2 X10E3/UL (ref 1.4–7)
NEUTROPHILS NFR BLD AUTO: 55 %
PLATELET # BLD AUTO: 240 X10E3/UL (ref 150–379)
POTASSIUM SERPL-SCNC: 4.1 MMOL/L (ref 3.5–5.2)
PROT SERPL-MCNC: 7.8 G/DL (ref 6–8.5)
RBC # BLD AUTO: 5.26 X10E6/UL (ref 4.14–5.8)
SODIUM SERPL-SCNC: 142 MMOL/L (ref 134–144)
TRIGL SERPL-MCNC: 109 MG/DL (ref 0–149)
VLDLC SERPL CALC-MCNC: 22 MG/DL (ref 5–40)
WBC # BLD AUTO: 5.8 X10E3/UL (ref 3.4–10.8)

## 2018-01-15 RX ORDER — SIMVASTATIN 20 MG/1
TABLET, FILM COATED ORAL
Qty: 90 TAB | Refills: 3 | Status: SHIPPED | OUTPATIENT
Start: 2018-01-15 | End: 2019-01-06 | Stop reason: SDUPTHER

## 2018-07-12 RX ORDER — POTASSIUM CHLORIDE 20 MEQ/1
TABLET, EXTENDED RELEASE ORAL
Qty: 60 TAB | Refills: 0 | Status: SHIPPED | OUTPATIENT
Start: 2018-07-12 | End: 2018-08-08 | Stop reason: SDUPTHER

## 2018-07-19 ENCOUNTER — TELEPHONE (OUTPATIENT)
Dept: FAMILY MEDICINE CLINIC | Age: 63
End: 2018-07-19

## 2018-07-19 RX ORDER — AMLODIPINE BESYLATE 5 MG/1
5 TABLET ORAL DAILY
Qty: 30 TAB | Refills: 2 | Status: SHIPPED | OUTPATIENT
Start: 2018-07-19 | End: 2018-08-08 | Stop reason: SDUPTHER

## 2018-07-19 NOTE — TELEPHONE ENCOUNTER
Patient called to advise that Nisoldipine is on backorder and he is out of medicine- I called [facility] to see if they had the 20 mg twice daily but they do not and it will not be in stock until the end of September. Please advise!

## 2018-07-30 DIAGNOSIS — F41.9 ANXIETY: ICD-10-CM

## 2018-07-30 RX ORDER — ALPRAZOLAM 1 MG/1
TABLET ORAL
Qty: 60 TAB | Refills: 0 | Status: SHIPPED | OUTPATIENT
Start: 2018-07-30 | End: 2018-08-29 | Stop reason: SDUPTHER

## 2018-08-02 ENCOUNTER — TELEPHONE (OUTPATIENT)
Dept: FAMILY MEDICINE CLINIC | Age: 63
End: 2018-08-02

## 2018-08-02 NOTE — TELEPHONE ENCOUNTER
[facility] called and requesting to know if we will be refilling XANEX. They stated they have been waiting for refill for a couple days and have not heard anything.  [phone]

## 2018-08-08 ENCOUNTER — OFFICE VISIT (OUTPATIENT)
Dept: FAMILY MEDICINE CLINIC | Age: 63
End: 2018-08-08

## 2018-08-08 VITALS
SYSTOLIC BLOOD PRESSURE: 152 MMHG | DIASTOLIC BLOOD PRESSURE: 83 MMHG | OXYGEN SATURATION: 96 % | TEMPERATURE: 96.9 F | BODY MASS INDEX: 30.7 KG/M2 | HEART RATE: 79 BPM | WEIGHT: 179.8 LBS | HEIGHT: 64 IN | RESPIRATION RATE: 14 BRPM

## 2018-08-08 DIAGNOSIS — E78.00 HIGH CHOLESTEROL: ICD-10-CM

## 2018-08-08 DIAGNOSIS — I10 ESSENTIAL HYPERTENSION: Primary | ICD-10-CM

## 2018-08-08 RX ORDER — HYDROCHLOROTHIAZIDE 25 MG/1
TABLET ORAL
Qty: 90 TAB | Refills: 3 | Status: SHIPPED | OUTPATIENT
Start: 2018-08-08

## 2018-08-08 RX ORDER — AMLODIPINE BESYLATE 10 MG/1
10 TABLET ORAL DAILY
Qty: 90 TAB | Refills: 12 | Status: SHIPPED | OUTPATIENT
Start: 2018-08-08 | End: 2019-08-19 | Stop reason: SDUPTHER

## 2018-08-08 RX ORDER — DICLOFENAC SODIUM 75 MG/1
75 TABLET, DELAYED RELEASE ORAL 2 TIMES DAILY
Qty: 60 TAB | Refills: 12 | Status: SHIPPED | OUTPATIENT
Start: 2018-08-08 | End: 2019-10-24 | Stop reason: SDUPTHER

## 2018-08-08 NOTE — MR AVS SNAPSHOT
[facility] 
 
 
 [address] [facility] [place] [id number] [phone] Patient: Devora Holland Sr. MRN: [id number] FVV:[id number]/[id number] Visit Information Date & Time Provider Department Dept. Phone Encounter #  
 8/8/2018  9:45 AM Inocencia Joaquin MD [facility] [phone] [id number] Follow-up Instructions Return in about 6 months (around 2/8/2019). Upcoming Health Maintenance Date Due  
 GLAUCOMA SCREENING Q2Y 2/14/2018 Pneumococcal 65+ Low/Medium Risk (1 of 2 - PCV13) 2/14/2018 Influenza Age 5 to Adult 8/1/2018 DTaP/Tdap/Td series (2 - Td) 12/18/2024 COLONOSCOPY 1/8/2028 Allergies as of 8/8/2018  Review Complete On: 8/8/2018 By: Inocencia Joaquin MD  
  
 Severity Noted Reaction Type Reactions Naproxen  07/12/2017    Other (comments)  
 nosebleeds Other Medication  03/26/2010    Other (comments) oxepam  Patient states he doesn't know what this is. Tylenol [Acetaminophen]  01/08/2018    Other (comments) Makes \"nose bleed\" and \"throat dry\" Current Immunizations  Reviewed on 12/18/2014 Name Date Tdap 12/18/2014 Not reviewed this visit You Were Diagnosed With   
  
 Codes Comments Essential hypertension    -  Primary ICD-10-CM: I10 
ICD-9-CM: 401.9 High cholesterol     ICD-10-CM: E78.00 ICD-9-CM: 272.0 Vitals BP Pulse Temp Resp Height(growth percentile) Weight(growth percentile) 152/83 79 96.9 °F (36.1 °C) (Oral) 14 5' 4\" (1.626 m) 179 lb 12.8 oz (81.6 kg) SpO2 BMI Smoking Status 96% 30.86 kg/m2 Former Smoker Vitals History BMI and BSA Data Body Mass Index Body Surface Area  
 30.86 kg/m 2 1.92 m 2 Preferred Pharmacy Pharmacy Name Phone [facility] [address] [phone] Your Updated Medication List  
  
   
 This list is accurate as of 8/8/18 10:46 AM.  Always use your most recent med list.  
  
  
  
  
 ALPRAZolam 1 mg tablet Commonly known as:  XANAX  
TAKE 1 TABLET BY MOUTH TWICE DAILY  
  
 amLODIPine 10 mg tablet Commonly known as:  Lennis Eagles Take 1 Tab by mouth daily. For blood pressure  
  
 diclofenac EC 75 mg EC tablet Commonly known as:  VOLTAREN Take 1 Tab by mouth two (2) times a day. For arthritis  
  
 hydroCHLOROthiazide 25 mg tablet Commonly known as:  HYDRODIURIL  
TAKE 1 TABLET BY MOUTH DAILY FOR BLOOD PRESSURE potassium chloride 20 mEq tablet Commonly known as:  K-DUR, KLOR-CON  
TAKE 1 TABLET BY MOUTH TWICE DAILY  
  
 simvastatin 20 mg tablet Commonly known as:  ZOCOR  
TAKE 1 TABLET BY MOUTH AT BEDTIME FOR CHOLESTEROL Prescriptions Sent to Pharmacy Refills  
 amLODIPine (NORVASC) 10 mg tablet 12 Sig: Take 1 Tab by mouth daily. For blood pressure Class: Normal  
 Pharmacy: [address] Ph #: [phone] Route: Oral  
 hydroCHLOROthiazide (HYDRODIURIL) 25 mg tablet 3 Sig: TAKE 1 TABLET BY MOUTH DAILY FOR BLOOD PRESSURE Class: Normal  
 Pharmacy: [address] Ph #: [phone]  
 diclofenac EC (VOLTAREN) 75 mg EC tablet 12 Sig: Take 1 Tab by mouth two (2) times a day. For arthritis Class: Normal  
 Pharmacy: [address] Ph #: [phone] Route: Oral  
  
Follow-up Instructions Return in about 6 months (around 2/8/2019). Introducing hospitals HEALTH SERVICES! [employer] introduces [employer]eli patient portal. Now you can access parts of your medical record, email your doctor's office, and request medication refills online.    
 
1. In your internet browser, go to [email or web address] 2. Click on the First Time User? Click Here link in the Sign In box. You will see the New Member Sign Up page. 3. Enter your [employer] Access Code exactly as it appears below. You will not need to use this code after youve completed the sign-up process. If you do not sign up before the expiration date, you must request a new code. · [employer] Access Code: [email or web address] Expires: 11/6/2018  9:51 AM 
 
4. Enter the last four digits of your Social Security Number (xxxx) and Date of Birth (mm/dd/yyyy) as indicated and click Submit. You will be taken to the next sign-up page. 5. Create a Lynx[employer] Solution[employer] ID. This will be your [employer] login ID and cannot be changed, so think of one that is secure and easy to remember. 6. Create a [employer] password. You can change your password at any time. 7. Enter your Password Reset Question and Answer. This can be used at a later time if you forget your password. 8. Enter your e-mail address. You will receive e-mail notification when new information is available in [address] 9. Click Sign Up. You can now view and download portions of your medical record. 10. Click the Download Summary menu link to download a portable copy of your medical information. If you have questions, please visit the Frequently Asked Questions section of the [employer] website. Remember, [employer] is NOT to be used for urgent needs. For medical emergencies, dial 911. Now available from your iPhone and Android! Please provide this summary of care documentation to your next provider. Your primary care clinician is listed as Leonel Barger. If you have any questions after today's visit, please call [phone].

## 2018-08-09 LAB
ALBUMIN SERPL-MCNC: 4.8 G/DL (ref 3.6–4.8)
ALBUMIN/GLOB SERPL: 1.7 {RATIO} (ref 1.2–2.2)
ALP SERPL-CCNC: 62 IU/L (ref 39–117)
ALT SERPL-CCNC: 28 IU/L (ref 0–44)
AST SERPL-CCNC: 29 IU/L (ref 0–40)
BASOPHILS # BLD AUTO: 0 X10E3/UL (ref 0–0.2)
BASOPHILS NFR BLD AUTO: 1 %
BILIRUB SERPL-MCNC: 0.5 MG/DL (ref 0–1.2)
BUN SERPL-MCNC: 13 MG/DL (ref 8–27)
BUN/CREAT SERPL: 13 (ref 10–24)
CALCIUM SERPL-MCNC: 10 MG/DL (ref 8.6–10.2)
CHLORIDE SERPL-SCNC: 101 MMOL/L (ref 96–106)
CHOLEST SERPL-MCNC: 157 MG/DL (ref 100–199)
CO2 SERPL-SCNC: 24 MMOL/L (ref 20–29)
CREAT SERPL-MCNC: 1 MG/DL (ref 0.76–1.27)
EOSINOPHIL # BLD AUTO: 0.3 X10E3/UL (ref 0–0.4)
EOSINOPHIL NFR BLD AUTO: 7 %
ERYTHROCYTE [DISTWIDTH] IN BLOOD BY AUTOMATED COUNT: 13.9 % (ref 12.3–15.4)
GLOBULIN SER CALC-MCNC: 2.8 G/DL (ref 1.5–4.5)
GLUCOSE SERPL-MCNC: 112 MG/DL (ref 65–99)
HCT VFR BLD AUTO: 46.3 % (ref 37.5–51)
HDLC SERPL-MCNC: 57 MG/DL
HGB BLD-MCNC: 15.3 G/DL (ref 13–17.7)
IMM GRANULOCYTES # BLD: 0 X10E3/UL (ref 0–0.1)
IMM GRANULOCYTES NFR BLD: 0 %
INTERPRETATION, 910389: NORMAL
LDLC SERPL CALC-MCNC: 88 MG/DL (ref 0–99)
LYMPHOCYTES # BLD AUTO: 1.7 X10E3/UL (ref 0.7–3.1)
LYMPHOCYTES NFR BLD AUTO: 41 %
MCH RBC QN AUTO: 28.6 PG (ref 26.6–33)
MCHC RBC AUTO-ENTMCNC: 33 G/DL (ref 31.5–35.7)
MCV RBC AUTO: 87 FL (ref 79–97)
MONOCYTES # BLD AUTO: 0.3 X10E3/UL (ref 0.1–0.9)
MONOCYTES NFR BLD AUTO: 7 %
NEUTROPHILS # BLD AUTO: 1.8 X10E3/UL (ref 1.4–7)
NEUTROPHILS NFR BLD AUTO: 44 %
PLATELET # BLD AUTO: 260 X10E3/UL (ref 150–379)
POTASSIUM SERPL-SCNC: 3.8 MMOL/L (ref 3.5–5.2)
PROT SERPL-MCNC: 7.6 G/DL (ref 6–8.5)
RBC # BLD AUTO: 5.35 X10E6/UL (ref 4.14–5.8)
SODIUM SERPL-SCNC: 143 MMOL/L (ref 134–144)
TRIGL SERPL-MCNC: 60 MG/DL (ref 0–149)
VLDLC SERPL CALC-MCNC: 12 MG/DL (ref 5–40)
WBC # BLD AUTO: 4 X10E3/UL (ref 3.4–10.8)

## 2018-08-21 RX ORDER — NISOLDIPINE 40 MG/1
TABLET, FILM COATED, EXTENDED RELEASE ORAL
Qty: 30 TAB | Refills: 0 | Status: SHIPPED | OUTPATIENT
Start: 2018-08-21 | End: 2018-09-27 | Stop reason: ALTCHOICE

## 2018-08-29 DIAGNOSIS — F41.9 ANXIETY: ICD-10-CM

## 2018-08-29 RX ORDER — ALPRAZOLAM 1 MG/1
TABLET ORAL
Qty: 60 TAB | Refills: 0 | Status: SHIPPED | OUTPATIENT
Start: 2018-08-29 | End: 2018-09-27 | Stop reason: SDUPTHER

## 2018-08-30 ENCOUNTER — TELEPHONE (OUTPATIENT)
Dept: FAMILY MEDICINE CLINIC | Age: 63
End: 2018-08-30

## 2018-08-31 RX ORDER — HYDROCHLOROTHIAZIDE 25 MG/1
TABLET ORAL
Qty: 90 TAB | Refills: 0 | Status: SHIPPED | OUTPATIENT
Start: 2018-08-31 | End: 2018-09-27 | Stop reason: SDUPTHER

## 2018-09-17 RX ORDER — NISOLDIPINE 40 MG/1
TABLET, FILM COATED, EXTENDED RELEASE ORAL
Qty: 30 TAB | Refills: 0 | Status: SHIPPED | OUTPATIENT
Start: 2018-09-17 | End: 2018-09-27 | Stop reason: ALTCHOICE

## 2018-09-27 ENCOUNTER — OFFICE VISIT (OUTPATIENT)
Dept: FAMILY MEDICINE CLINIC | Age: 63
End: 2018-09-27

## 2018-09-27 VITALS
SYSTOLIC BLOOD PRESSURE: 133 MMHG | DIASTOLIC BLOOD PRESSURE: 79 MMHG | RESPIRATION RATE: 14 BRPM | HEIGHT: 64 IN | WEIGHT: 183.6 LBS | OXYGEN SATURATION: 98 % | BODY MASS INDEX: 31.34 KG/M2 | TEMPERATURE: 96.5 F | HEART RATE: 67 BPM

## 2018-09-27 DIAGNOSIS — F41.9 ANXIETY: ICD-10-CM

## 2018-09-27 RX ORDER — ALPRAZOLAM 1 MG/1
TABLET ORAL
Qty: 60 TAB | Refills: 5 | Status: SHIPPED | OUTPATIENT
Start: 2018-09-27

## 2018-09-27 RX ORDER — FLUTICASONE PROPIONATE 50 MCG
SPRAY, SUSPENSION (ML) NASAL
Qty: 1 BOTTLE | Refills: 12 | Status: SHIPPED | OUTPATIENT
Start: 2018-09-27

## 2018-09-27 NOTE — PROGRESS NOTES
HISTORY OF PRESENT ILLNESS Baron Mehta is a 70 y.o. male. HPI Pt. Comes in for blood pressure check. Feels like nose is stopped up in am, for the last 6 months. Not taking any meds for it. Needs cholesterol checked also. Still working. Has had a dry cough intermittently. Has had some diarrhea since Monday, took some peptobismol- seems to be doing better. ROS Physical Exam  
Constitutional: He appears well-developed and well-nourished. HENT:  
Right Ear: External ear normal.  
Left Ear: External ear normal.  
Mouth/Throat: Oropharynx is clear and moist.  
Neck: No thyromegaly present. Cardiovascular: Normal rate, regular rhythm, normal heart sounds and intact distal pulses. Pulmonary/Chest: Effort normal and breath sounds normal. No respiratory distress. He has no wheezes. Abdominal: Soft. Bowel sounds are normal. He exhibits no distension and no mass. There is no tenderness. There is no guarding. Musculoskeletal: Normal range of motion. He exhibits no edema. Lymphadenopathy:  
  He has no cervical adenopathy. Nursing note and vitals reviewed. ASSESSMENT and PLAN Orders Placed This Encounter  ALPRAZolam (XANAX) 1 mg tablet Sig: TAKE 1 TABLET BY MOUTH TWICE DAILY Dispense:  60 Tab Refill:  5  
 fluticasone (FLONASE) 50 mcg/actuation nasal spray Si sprays each nostril daily Dispense:  1 Bottle Refill:  12 Orders Placed This Encounter  ALPRAZolam (XANAX) 1 mg tablet  fluticasone (FLONASE) 50 mcg/actuation nasal spray Diagnoses and all orders for this visit: 
 
1. Anxiety -     ALPRAZolam (XANAX) 1 mg tablet; TAKE 1 TABLET BY MOUTH TWICE DAILY Other orders 
-     fluticasone (FLONASE) 50 mcg/actuation nasal spray; 2 sprays each nostril daily

## 2018-09-27 NOTE — MR AVS SNAPSHOT
[facility] 
 
 
 [address] [facility] [address] 
[phone] Patient: Ivan Sheridan Sr. MRN: [id number] Visit Information Date & Time Provider Department Dept. Phone Encounter #  
 9/27/2018 10:30 AM Matty Chaudhary MD [facility] [phone] [id number] Follow-up Instructions Return in about 6 months (around 3/27/2019). Your Appointments 2/11/2019  8:30 AM  
ROUTINE CARE with Matty Chaudhary MD  
[facility] [address] [facility]) Appt Note: routine follow up  
 [address] [facility] [id number] [phone] [address] [facility] [address] Upcoming Health Maintenance Date Due Shingrix Vaccine Age 50> (1 of 2) 2/14/2003 GLAUCOMA SCREENING Q2Y 2/14/2018 Pneumococcal 65+ Low/Medium Risk (1 of 2 - PCV13) 3/29/2019* Influenza Age 5 to Adult 3/29/2019* DTaP/Tdap/Td series (2 - Td) 12/18/2024 COLONOSCOPY 1/8/2028 *Topic was postponed. The date shown is not the original due date. Allergies as of 9/27/2018  Review Complete On: 9/27/2018 By: Matty Chaudhary MD  
  
 Severity Noted Reaction Type Reactions Naproxen  07/12/2017    Other (comments)  
 nosebleeds Other Medication  03/26/2010    Other (comments) oxepam  Patient states he doesn't know what this is. Tylenol [Acetaminophen]  01/08/2018    Other (comments) Makes \"nose bleed\" and \"throat dry\" Current Immunizations  Reviewed on 12/18/2014 Name Date Tdap 12/18/2014 Not reviewed this visit You Were Diagnosed With   
  
 Codes Comments Anxiety     ICD-10-CM: F41.9 ICD-9-CM: 300.00 Vitals BP Pulse Temp Resp Height(growth percentile) Weight(growth percentile) 133/79 67 96.5 °F (35.8 °C) (Oral) 14 5' 4\" (1.626 m) 183 lb 9.6 oz (83.3 kg) SpO2 BMI Smoking Status 98% 31.51 kg/m2 Former Smoker Vitals History BMI and BSA Data Body Mass Index Body Surface Area  
 31.51 kg/m 2 1.94 m 2 Preferred Pharmacy Pharmacy Name Phone [facility] [address] [phone] Your Updated Medication List  
  
   
This list is accurate as of 18 11:31 AM.  Always use your most recent med list.  
  
  
  
  
 ALPRAZolam 1 mg tablet Commonly known as:  XANAX  
TAKE 1 TABLET BY MOUTH TWICE DAILY  
  
 amLODIPine 10 mg tablet Commonly known as:  Lennis Eagles Take 1 Tab by mouth daily. For blood pressure  
  
 diclofenac EC 75 mg EC tablet Commonly known as:  VOLTAREN Take 1 Tab by mouth two (2) times a day. For arthritis  
  
 fluticasone 50 mcg/actuation nasal spray Commonly known as:  FLONASE  
2 sprays each nostril daily  
  
 hydroCHLOROthiazide 25 mg tablet Commonly known as:  HYDRODIURIL  
TAKE 1 TABLET BY MOUTH DAILY FOR BLOOD PRESSURE potassium chloride 20 mEq tablet Commonly known as:  K-DUR, KLOR-CON  
TAKE 1 TABLET BY MOUTH TWICE DAILY  
  
 simvastatin 20 mg tablet Commonly known as:  ZOCOR  
TAKE 1 TABLET BY MOUTH AT BEDTIME FOR CHOLESTEROL Prescriptions Printed Refills ALPRAZolam (XANAX) 1 mg tablet 5 Sig: TAKE 1 TABLET BY MOUTH TWICE DAILY Class: Print Prescriptions Sent to Pharmacy Refills  
 fluticasone (FLONASE) 50 mcg/actuation nasal spray 12 Si sprays each nostril daily Class: Normal  
 Pharmacy: [facility] [address] #: [phone] Follow-up Instructions Return in about 6 months (around 3/27/2019). Introducing [facility] & HEALTH SERVICES! [employer] introduces [employer] patient portal. Now you can access parts of your medical record, email your doctor's office, and request medication refills online.    
 
1. In your internet browser, go to [email or web address] 2. Click on the First Time User? Click Here link in the Sign In box. You will see the New Member Sign Up page. 3. Enter your [employer] Access Code exactly as it appears below. You will not need to use this code after youve completed the sign-up process. If you do not sign up before the expiration date, you must request a new code. · [employer] Access Code: [email or web address] Expires: 11/6/2018  9:51 AM 
 
4. Enter the last four digits of your Social Security Number (xxxx) and Date of Birth (mm/dd/yyyy) as indicated and click Submit. You will be taken to the next sign-up page. 5. Create a [employer] ID. This will be your K[employer] login ID and cannot be changed, so think of one that is secure and easy to remember. 6. Create a [employer] password. You can change your password at any time. 7. Enter your Password Reset Question and Answer. This can be used at a later time if you forget your password. 8. Enter your e-mail address. You will receive e-mail notification when new information is available in [address] 9. Click Sign Up. You can now view and download portions of your medical record. 10. Click the Download Summary menu link to download a portable copy of your medical information. If you have questions, please visit the Frequently Asked Questions section of the [employer] website. Remember, [employer] is NOT to be used for urgent needs. For medical emergencies, dial 911. Now available from your iPhone and Android! Please provide this summary of care documentation to your next provider. Your primary care clinician is listed as Leonel Barger. If you have any questions after today's visit, please call [phone].

## 2018-09-27 NOTE — PROGRESS NOTES
Chief Complaint Patient presents with  Cold 6 months  Cough  
  dry  Nausea  
  this past monday  Diarrhea  
  this past monday 1. Have you been to the ER, urgent care clinic since your last visit? Hospitalized since your last visit? No 
 
2. Have you seen or consulted any other health care providers outside of the [address] [place] [address] since your last visit? Include any pap smears or colon screening. No  
 
 
Health Maintenance Due Topic Date Due  Shingrix Vaccine Age 50> (1 of 2) 02/14/2003  GLAUCOMA SCREENING Q2Y  02/14/2018

## 2019-01-07 RX ORDER — SIMVASTATIN 20 MG/1
TABLET, FILM COATED ORAL
Qty: 90 TAB | Refills: 0 | Status: SHIPPED | OUTPATIENT
Start: 2019-01-07

## 2019-01-08 RX ORDER — POTASSIUM CHLORIDE 20 MEQ/1
TABLET, EXTENDED RELEASE ORAL
Qty: 60 TAB | Refills: 0 | Status: SHIPPED | OUTPATIENT
Start: 2019-01-08 | End: 2019-02-07 | Stop reason: SDUPTHER

## 2019-02-07 RX ORDER — POTASSIUM CHLORIDE 20 MEQ/1
TABLET, EXTENDED RELEASE ORAL
Qty: 60 TAB | Refills: 0 | Status: SHIPPED | OUTPATIENT
Start: 2019-02-07

## 2019-08-19 RX ORDER — AMLODIPINE BESYLATE 10 MG/1
TABLET ORAL
Qty: 90 TAB | Refills: 0 | Status: SHIPPED | OUTPATIENT
Start: 2019-08-19 | End: 2020-01-22

## 2019-10-08 RX ORDER — SIMVASTATIN 10 MG/1
TABLET, FILM COATED ORAL
Qty: 90 TAB | Refills: 0 | Status: SHIPPED | OUTPATIENT
Start: 2019-10-08

## 2019-10-24 RX ORDER — DICLOFENAC SODIUM 75 MG/1
TABLET, DELAYED RELEASE ORAL
Qty: 60 TAB | Refills: 0 | Status: SHIPPED | OUTPATIENT
Start: 2019-10-24

## 2020-01-22 RX ORDER — AMLODIPINE BESYLATE 10 MG/1
TABLET ORAL
Qty: 90 TAB | Refills: 0 | Status: SHIPPED | OUTPATIENT
Start: 2020-01-22

## 2022-04-20 ENCOUNTER — APPOINTMENT (OUTPATIENT)
Dept: GENERAL RADIOLOGY | Age: 67
End: 2022-04-20
Attending: EMERGENCY MEDICINE
Payer: MEDICARE

## 2022-04-20 ENCOUNTER — HOSPITAL ENCOUNTER (EMERGENCY)
Age: 67
Discharge: HOME | End: 2022-04-20
Attending: EMERGENCY MEDICINE
Payer: MEDICARE

## 2022-04-20 VITALS
RESPIRATION RATE: 18 BRPM | TEMPERATURE: 98.4 F | HEIGHT: 64 IN | SYSTOLIC BLOOD PRESSURE: 120 MMHG | HEART RATE: 100 BPM | BODY MASS INDEX: 32.78 KG/M2 | WEIGHT: 192.02 LBS | DIASTOLIC BLOOD PRESSURE: 75 MMHG | OXYGEN SATURATION: 95 %

## 2022-04-20 DIAGNOSIS — S39.012A BACK STRAIN, INITIAL ENCOUNTER: ICD-10-CM

## 2022-04-20 DIAGNOSIS — M54.6 ACUTE LEFT-SIDED THORACIC BACK PAIN: Primary | ICD-10-CM

## 2022-04-20 LAB
ALBUMIN SERPL-MCNC: 4 G/DL (ref 3.5–5)
ALBUMIN/GLOB SERPL: 1.1 {RATIO} (ref 1.1–2.2)
ALP SERPL-CCNC: 49 U/L (ref 45–117)
ALT SERPL-CCNC: 23 U/L (ref 12–78)
ANION GAP SERPL CALC-SCNC: 5 MMOL/L (ref 5–15)
AST SERPL-CCNC: 19 U/L (ref 15–37)
ATRIAL RATE: 85 BPM
BASOPHILS # BLD: 0 K/UL (ref 0–0.1)
BASOPHILS NFR BLD: 1 % (ref 0–1)
BILIRUB SERPL-MCNC: 0.5 MG/DL (ref 0.2–1)
BUN SERPL-MCNC: 15 MG/DL (ref 6–20)
BUN/CREAT SERPL: 10 (ref 12–20)
CALCIUM SERPL-MCNC: 9 MG/DL (ref 8.5–10.1)
CALCULATED P AXIS, ECG09: 48 DEGREES
CALCULATED R AXIS, ECG10: -20 DEGREES
CALCULATED T AXIS, ECG11: 32 DEGREES
CHLORIDE SERPL-SCNC: 106 MMOL/L (ref 97–108)
CO2 SERPL-SCNC: 28 MMOL/L (ref 21–32)
CREAT SERPL-MCNC: 1.44 MG/DL (ref 0.7–1.3)
DIAGNOSIS, 93000: NORMAL
DIFFERENTIAL METHOD BLD: NORMAL
EOSINOPHIL # BLD: 0.2 K/UL (ref 0–0.4)
EOSINOPHIL NFR BLD: 3 % (ref 0–7)
ERYTHROCYTE [DISTWIDTH] IN BLOOD BY AUTOMATED COUNT: 12.8 % (ref 11.5–14.5)
GLOBULIN SER CALC-MCNC: 3.7 G/DL (ref 2–4)
GLUCOSE SERPL-MCNC: 134 MG/DL (ref 65–100)
HCT VFR BLD AUTO: 42.5 % (ref 36.6–50.3)
HGB BLD-MCNC: 13.9 G/DL (ref 12.1–17)
IMM GRANULOCYTES # BLD AUTO: 0 K/UL (ref 0–0.04)
IMM GRANULOCYTES NFR BLD AUTO: 0 % (ref 0–0.5)
LYMPHOCYTES # BLD: 1.5 K/UL (ref 0.8–3.5)
LYMPHOCYTES NFR BLD: 30 % (ref 12–49)
MCH RBC QN AUTO: 29.2 PG (ref 26–34)
MCHC RBC AUTO-ENTMCNC: 32.7 G/DL (ref 30–36.5)
MCV RBC AUTO: 89.3 FL (ref 80–99)
MONOCYTES # BLD: 0.4 K/UL (ref 0–1)
MONOCYTES NFR BLD: 8 % (ref 5–13)
NEUTS SEG # BLD: 2.9 K/UL (ref 1.8–8)
NEUTS SEG NFR BLD: 58 % (ref 32–75)
NRBC # BLD: 0 K/UL (ref 0–0.01)
NRBC BLD-RTO: 0 PER 100 WBC
P-R INTERVAL, ECG05: 144 MS
PLATELET # BLD AUTO: 213 K/UL (ref 150–400)
PMV BLD AUTO: 9.4 FL (ref 8.9–12.9)
POTASSIUM SERPL-SCNC: 3.6 MMOL/L (ref 3.5–5.1)
PROT SERPL-MCNC: 7.7 G/DL (ref 6.4–8.2)
Q-T INTERVAL, ECG07: 364 MS
QRS DURATION, ECG06: 84 MS
QTC CALCULATION (BEZET), ECG08: 433 MS
RBC # BLD AUTO: 4.76 M/UL (ref 4.1–5.7)
SODIUM SERPL-SCNC: 139 MMOL/L (ref 136–145)
TROPONIN-HIGH SENSITIVITY: 8 NG/L (ref 0–76)
VENTRICULAR RATE, ECG03: 85 BPM
WBC # BLD AUTO: 5.1 K/UL (ref 4.1–11.1)

## 2022-04-20 PROCEDURE — 84484 ASSAY OF TROPONIN QUANT: CPT

## 2022-04-20 PROCEDURE — 74011000250 HC RX REV CODE- 250: Performed by: EMERGENCY MEDICINE

## 2022-04-20 PROCEDURE — 85025 COMPLETE CBC W/AUTO DIFF WBC: CPT

## 2022-04-20 PROCEDURE — 71046 X-RAY EXAM CHEST 2 VIEWS: CPT

## 2022-04-20 PROCEDURE — 74011250637 HC RX REV CODE- 250/637: Performed by: EMERGENCY MEDICINE

## 2022-04-20 PROCEDURE — 99285 EMERGENCY DEPT VISIT HI MDM: CPT

## 2022-04-20 PROCEDURE — 36415 COLL VENOUS BLD VENIPUNCTURE: CPT

## 2022-04-20 PROCEDURE — 93005 ELECTROCARDIOGRAM TRACING: CPT

## 2022-04-20 PROCEDURE — 80053 COMPREHEN METABOLIC PANEL: CPT

## 2022-04-20 RX ORDER — METHOCARBAMOL 500 MG/1
500 TABLET, FILM COATED ORAL 4 TIMES DAILY
Qty: 20 TABLET | Refills: 0 | Status: SHIPPED | OUTPATIENT
Start: 2022-04-20

## 2022-04-20 RX ADMIN — LIDOCAINE HYDROCHLORIDE 40 ML: 20 SOLUTION ORAL; TOPICAL at 05:58

## 2022-04-20 NOTE — Clinical Note
[address]  [facility] EMERGENC[facility] DEPT  [address] [place] [id number] [phone]    Work/School Note    Date: 4/20/2022    To Whom It May concern:    Celestine Morales was seen and treated today in the emergency room by the following provider(s):  Attending Provider: Layne Coker MD.      Celestine Morales is excused from work/school on 04/20/22 and 04/21/22. He is medically clear to return to work/school on 4/22/2022.        Sincerely,          Frances Andersen MD

## 2022-04-20 NOTE — ED NOTES
Shift change report given to [address] (oncoming nurse) by Anh Loja (offgoing nurse). Report included the following information SBAR, Kardex, ED Summary, Intake/Output, MAR and Recent Results.

## 2022-04-20 NOTE — ED PROVIDER NOTES
EMERGENCY DEPARTMENT HISTORY AND PHYSICAL EXAM      Date: 4/20/2022  Patient Name: Dinora King Sr.    History of Presenting Illness     No chief complaint on file. History Provided By: Patient    HPI: Selina Gamez., 69 y.o. male with PMHx significant for hypertension, hyperlipidemia, anxiety/panic attacks, arthritis presents to the ED with left-sided mid back pain that started about 3 days ago. Patient reports it hurts when he twists side to side or when he bends over. States he thinks he had similar symptoms years ago and it was related to indigestion. Denies any chest pain, shortness of breath, cough, abdominal pain, nausea, vomiting, fevers, chills. He does report increased pain with deep breathing. Denies any recent travel, leg swelling, or calf pain. He does not smoke and does not use alcohol or drugs. Pain is moderate in intensity and sharp in nature. PCP: Nayan Alfaro MD    No current facility-administered medications on file prior to encounter.      Current Outpatient Medications on File Prior to Encounter   Medication Sig Dispense Refill    amLODIPine (NORVASC) 10 mg tablet TAKE 1 TABLET BY MOUTH DAILY FOR BLOOD PRESSURE 90 Tab 0    diclofenac EC (VOLTAREN) 75 mg EC tablet TAKE 1 TABLET BY MOUTH TWICE DAILY FOR ARTHRITIS 60 Tab 0    simvastatin (ZOCOR) 10 mg tablet TAKE 1 TABLET BY MOUTH EVERY NIGHT AT BEDTIME FOR CHOLESTEROL 90 Tab 0    potassium chloride (K-DUR, KLOR-CON) 20 mEq tablet TAKE 1 TABLET BY MOUTH TWICE DAILY 60 Tab 0    simvastatin (ZOCOR) 20 mg tablet TAKE 1 TABLET BY MOUTH EVERY NIGHT AT BEDTIME FOR CHOLESTEROL 90 Tab 0    ALPRAZolam (XANAX) 1 mg tablet TAKE 1 TABLET BY MOUTH TWICE DAILY 60 Tab 5    fluticasone (FLONASE) 50 mcg/actuation nasal spray 2 sprays each nostril daily 1 Bottle 12    hydroCHLOROthiazide (HYDRODIURIL) 25 mg tablet TAKE 1 TABLET BY MOUTH DAILY FOR BLOOD PRESSURE 90 Tab 3       Past History     Past Medical History:  Past Medical History:   Diagnosis Date    Arthritis     High cholesterol 3/18/2010    HTN (hypertension) 3/18/2010    Insomnia     EYES PUFFY & BLOODSHOT ON AWAKENING AFTER FITFUL NIGHT 13    Other and unspecified symptoms and signs involving general sensations and perceptions     5-6 [address] Other ill-defined conditions(799.89)     R FEMUR FRACTURE AGE 10-PINS & TRACTION USED    Panic attacks 3/18/2010    Unspecified adverse effect of anesthesia     PT STATES HE DID NOT SLEEP DURING COLONOSCOPY-MEDS DID NOT SEDATE       Past Surgical History:  Past Surgical History:   Procedure Laterality Date    COLONOSCOPY       linda- normal    HX ANKLE FRACTURE TX      Right ankle ORIF    HX COLONOSCOPY      2015- Dr. Karly Gonzalez Right 13       Family History:  Family History   Problem Relation Age of Onset    Emphysema Mother     Heart Attack Father     Hypertension Brother     Stroke Brother     Hypertension Brother     Stroke Brother        Social History:  Social History     Tobacco Use    Smoking status: Former Smoker     Packs/day: 1.00     Years: 20.00     Pack years: 20.00     Quit date: 1991     Years since quittin.8    Smokeless tobacco: Never Used    Tobacco comment:    Substance Use Topics    Alcohol use: Yes     Comment: occasionally    Drug use: No       Allergies: Allergies   Allergen Reactions    Naproxen Other (comments)     nosebleeds    Other Medication Other (comments)     oxepam  Patient states he doesn't know what this is.  Tylenol [Acetaminophen] Other (comments)     Makes \"nose bleed\" and \"throat dry\"         Review of Systems   Review of Systems   Constitutional: Negative for chills and fever. HENT: Negative for congestion, rhinorrhea and sore throat. Respiratory: Negative for cough, chest tightness, shortness of breath and wheezing. Cardiovascular: Positive for chest pain.  Negative for palpitations and leg swelling. Gastrointestinal: Negative for abdominal pain, constipation, diarrhea, nausea and vomiting. Genitourinary: Negative for dysuria, flank pain and hematuria. Musculoskeletal: Positive for back pain. Negative for myalgias and neck pain. Skin: Negative for rash and wound. Neurological: Negative for dizziness, syncope, light-headedness and headaches. Psychiatric/Behavioral: Negative for confusion. The patient is nervous/anxious. All other systems reviewed and are negative.         Physical Exam    General appearance - well nourished, well appearing, and in no distress  Eyes - pupils equal and reactive, extraocular eye movements intact  ENT - mucous membranes moist, pharynx normal without lesions  Neck - supple, no significant adenopathy; non-tender to palpation  Chest - clear to auscultation, no wheezes, rales or rhonchi; non-tender to palpation  Back-tender to palpation left mid back in para thoracic musculature, no tenderness over vertebrae  Heart - normal rate and regular rhythm, S1 and S2 normal, no murmurs noted  Abdomen - soft, nontender, nondistended, no masses or organomegaly  Musculoskeletal - no joint tenderness, deformity or swelling; normal ROM  Extremities - peripheral pulses normal, no pedal edema  Skin - normal coloration and turgor, no rashes  Neurological - alert, oriented x3, normal speech, no focal findings or movement disorder noted    Diagnostic Study Results     Labs -     Recent Results (from the past 12 hour(s))   EKG, 12 LEAD, INITIAL    Collection Time: 04/20/22  5:56 AM   Result Value Ref Range    Ventricular Rate 85 BPM    Atrial Rate 85 BPM    P-R Interval 144 ms    QRS Duration 84 ms    Q-T Interval 364 ms    QTC Calculation (Bezet) 433 ms    Calculated P Axis 48 degrees    Calculated R Axis -20 degrees    Calculated T Axis 32 degrees    Diagnosis       Normal sinus rhythm  Normal ECG  When compared with ECG of 13-JUN-2013 14:41,  No significant change was found CBC WITH AUTOMATED DIFF    Collection Time: 04/20/22  6:07 AM   Result Value Ref Range    WBC 5.1 4.1 - 11.1 K/uL    RBC 4.76 4.10 - 5.70 M/uL    HGB 13.9 12.1 - 17.0 g/dL    HCT 42.5 36.6 - 50.3 %    MCV 89.3 80.0 - 99.0 FL    MCH 29.2 26.0 - 34.0 PG    MCHC 32.7 30.0 - 36.5 g/dL    RDW 12.8 11.5 - 14.5 %    PLATELET 445 629 - 432 K/uL    MPV 9.4 8.9 - 12.9 FL    NRBC 0.0 0  WBC    ABSOLUTE NRBC 0.00 0.00 - 0.01 K/uL    NEUTROPHILS 58 32 - 75 %    LYMPHOCYTES 30 12 - 49 %    MONOCYTES 8 5 - 13 %    EOSINOPHILS 3 0 - 7 %    BASOPHILS 1 0 - 1 %    IMMATURE GRANULOCYTES 0 0.0 - 0.5 %    ABS. NEUTROPHILS 2.9 1.8 - 8.0 K/UL    ABS. LYMPHOCYTES 1.5 0.8 - 3.5 K/UL    ABS. MONOCYTES 0.4 0.0 - 1.0 K/UL    ABS. EOSINOPHILS 0.2 0.0 - 0.4 K/UL    ABS. BASOPHILS 0.0 0.0 - 0.1 K/UL    ABS. IMM. GRANS. 0.0 0.00 - 0.04 K/UL    DF AUTOMATED     METABOLIC PANEL, COMPREHENSIVE    Collection Time: 04/20/22  6:07 AM   Result Value Ref Range    Sodium 139 136 - 145 mmol/L    Potassium 3.6 3.5 - 5.1 mmol/L    Chloride 106 97 - 108 mmol/L    CO2 28 21 - 32 mmol/L    Anion gap 5 5 - 15 mmol/L    Glucose 134 (H) 65 - 100 mg/dL    BUN 15 6 - 20 MG/DL    Creatinine 1.44 (H) 0.70 - 1.30 MG/DL    BUN/Creatinine ratio 10 (L) 12 - 20      GFR est AA 59 (L) >60 ml/min/1.73m2    GFR est non-AA 49 (L) >60 ml/min/1.73m2    Calcium 9.0 8.5 - 10.1 MG/DL    Bilirubin, total 0.5 0.2 - 1.0 MG/DL    ALT (SGPT) 23 12 - 78 U/L    AST (SGOT) 19 15 - 37 U/L    Alk. phosphatase 49 45 - 117 U/L    Protein, total 7.7 6.4 - 8.2 g/dL    Albumin 4.0 3.5 - 5.0 g/dL    Globulin 3.7 2.0 - 4.0 g/dL    A-G Ratio 1.1 1.1 - 2.2     TROPONIN-HIGH SENSITIVITY    Collection Time: 04/20/22  6:07 AM   Result Value Ref Range    Troponin-High Sensitivity 8 0 - 76 ng/L       Radiologic Studies -   XR CHEST PA LAT   Final Result   No acute process.             CT Results  (Last 48 hours)    None        CXR Results  (Last 48 hours)               04/20/22 0605  XR CHEST PA LAT Final result    Impression:  No acute process. Narrative:  INDICATION: mid back pain       COMPARISON: None       FINDINGS:       Frontal and lateral views of the chest demonstrate a normal cardiomediastinal   silhouette. The lungs are adequately expanded. There is no edema, effusion,   consolidation, or pneumothorax. The osseous structures are unremarkable. Medical Decision Making   I am the first provider for this patient. I reviewed the vital signs, available nursing notes, past medical history, past surgical history, family history and social history. Vital Signs-Reviewed the patient's vital signs. Patient Vitals for the past 12 hrs:   Temp Pulse Resp BP SpO2   04/20/22 0631 -- -- -- 120/75 95 %   04/20/22 0616 -- -- -- 122/76 98 %   04/20/22 0601 -- -- -- 127/76 --   04/20/22 0517 98.4 °F (36.9 °C) 100 18 (!) 144/83 100 %       EKG: Normal sinus rhythm, 85 bpm, normal axis normal SC, QRS, QTc intervals, no ischemic changes    Records Reviewed: Nursing Notes and Old Medical Records    Provider Notes (Medical Decision Making):   Differential diagnosis: Muscle strain, pneumothorax, pleural effusion, acute coronary syndrome indigestion, low suspicion for PE  Will treat with Toradol and check CBC, CMP, troponin, chest x-ray, EKG    ED Course:   Initial assessment performed. The patients presenting problems have been discussed, and they are in agreement with the care plan formulated and outlined with them. I have encouraged them to ask questions as they arise throughout their visit. Progress Notes:  ED Course as of 04/20/22 0730 Wed Apr 20, 2022   0722 Pain is completely resolved after Toradol. Labs reviewed. CBC is unremarkable. CMP shows elevated blood glucose and elevated creatinine at 1.44 otherwise unremarkable. High-sensitivity troponin is 8. Chest x-ray does not show any acute process.   Will discharge with methocarbamol for muscle spasm and encourage follow-up with PCP. [AO]      ED Course User Index  [AO] Layne Coker MD       Disposition:  Discharge home    PLAN:  1. Discharge Medication List as of 4/20/2022  7:32 AM      START taking these medications    Details   methocarbamoL (ROBAXIN) 500 mg tablet Take 1 Tablet by mouth four (4) times daily. , Normal, Disp-20 Tablet, R-0         CONTINUE these medications which have NOT CHANGED    Details   amLODIPine (NORVASC) 10 mg tablet TAKE 1 TABLET BY MOUTH DAILY FOR BLOOD PRESSURE, Normal, Disp-90 Tab, R-0      diclofenac EC (VOLTAREN) 75 mg EC tablet TAKE 1 TABLET BY MOUTH TWICE DAILY FOR ARTHRITIS, Normal, Disp-60 Tab, R-0      !! simvastatin (ZOCOR) 10 mg tablet TAKE 1 TABLET BY MOUTH EVERY NIGHT AT BEDTIME FOR CHOLESTEROL, Normal, Disp-90 Tab, R-0      potassium chloride (K-DUR, KLOR-CON) 20 mEq tablet TAKE 1 TABLET BY MOUTH TWICE DAILY, Normal, Disp-60 Tab, R-0      !! simvastatin (ZOCOR) 20 mg tablet TAKE 1 TABLET BY MOUTH EVERY NIGHT AT BEDTIME FOR CHOLESTEROL, Normal, Disp-90 Tab, R-0      ALPRAZolam (XANAX) 1 mg tablet TAKE 1 TABLET BY MOUTH TWICE DAILY, Print, Disp-60 Tab, R-5      fluticasone (FLONASE) 50 mcg/actuation nasal spray 2 sprays each nostril daily, Normal, Disp-1 Bottle, R-12      hydroCHLOROthiazide (HYDRODIURIL) 25 mg tablet TAKE 1 TABLET BY MOUTH DAILY FOR BLOOD PRESSURE, Normal, Disp-90 Tab, R-3       !! - Potential duplicate medications found. Please discuss with provider. 2.   Follow-up Information     Follow up With Specialties Details Why Contact Info    Danielito Hopkins MD [facility] Medicine Schedule an appointment as soon as possible for a visit   [address] [facility] [address] [place] [facility] EMERGENCY DEPT Emergency Medicine  If symptoms worsen [address]  [address] [facility]  [phone]        Return to ED if worse     Diagnosis     Clinical Impression:   1. Acute left-sided thoracic back pain    2.  Back strain, initial encounter

## 2022-04-20 NOTE — ED NOTES
TRANSFER - IN REPORT:    Verbal report received from Isom Ormond and Courtney(name) on [address] [place] [address]. .  Report consisted of patients Situation, Background, Assessment and   Recommendations(SBAR). Information from the following report(s) SBAR and ED Summary was reviewed with the receiving nurse. Opportunity for questions and clarification was provided. Pt resting in stretcher comfortably and reporting resolution of CP after medication     0740 Pt discharged by Dr Will Whitaker. Pt provided with discharge instructions Rx and instructions on follow up care.  Pt out of ED ambulatory

## 2022-11-26 ENCOUNTER — HOSPITAL ENCOUNTER (EMERGENCY)
Age: 67
Discharge: HOME | End: 2022-11-26
Attending: STUDENT IN AN ORGANIZED HEALTH CARE EDUCATION/TRAINING PROGRAM
Payer: MEDICARE

## 2022-11-26 VITALS
HEART RATE: 92 BPM | DIASTOLIC BLOOD PRESSURE: 80 MMHG | SYSTOLIC BLOOD PRESSURE: 172 MMHG | HEIGHT: 64 IN | RESPIRATION RATE: 18 BRPM | BODY MASS INDEX: 32.82 KG/M2 | OXYGEN SATURATION: 99 % | WEIGHT: 192.24 LBS | TEMPERATURE: 98.2 F

## 2022-11-26 DIAGNOSIS — I10 ASYMPTOMATIC HYPERTENSION: ICD-10-CM

## 2022-11-26 DIAGNOSIS — J20.9 ACUTE BRONCHITIS, UNSPECIFIED ORGANISM: Primary | ICD-10-CM

## 2022-11-26 DIAGNOSIS — J06.9 UPPER RESPIRATORY TRACT INFECTION, UNSPECIFIED TYPE: ICD-10-CM

## 2022-11-26 LAB
COVID-19 RAPID TEST, COVR: NOT DETECTED
FLUAV AG NPH QL IA: NEGATIVE
FLUBV AG NOSE QL IA: NEGATIVE
SOURCE, COVRS: NORMAL

## 2022-11-26 PROCEDURE — 87804 INFLUENZA ASSAY W/OPTIC: CPT

## 2022-11-26 PROCEDURE — 99283 EMERGENCY DEPT VISIT LOW MDM: CPT

## 2022-11-26 PROCEDURE — 87635 SARS-COV-2 COVID-19 AMP PRB: CPT

## 2022-11-26 RX ORDER — BENZONATATE 100 MG/1
100 CAPSULE ORAL
Qty: 30 CAPSULE | Refills: 0 | Status: SHIPPED | OUTPATIENT
Start: 2022-11-26 | End: 2022-12-03

## 2022-11-26 RX ORDER — CODEINE PHOSPHATE AND GUAIFENESIN 10; 100 MG/5ML; MG/5ML
5 SOLUTION ORAL
Qty: 75 ML | Refills: 0 | Status: SHIPPED | OUTPATIENT
Start: 2022-11-26 | End: 2022-12-01

## 2022-11-27 NOTE — ED NOTES
EMERGENCY DEPARTMENT HISTORY AND PHYSICAL EXAM      Date: 11/26/2022  Patient Name: Ranjan Klein Sr.    History of Presenting Illness     Chief Complaint   Patient presents with    Cough     Pt ambulatory into triage with a cc of cough and cold like symptoms x 3 days        History Provided By: Patient    HPI: Iva El., 69 y.o. male with a history of HTN, hyperlipidemia, and panic attacks presents to the ED with cc of 4 days of mild coughing, nasal congestion, and fatigue. Patient denies fever, sore throat, N/V/D, chest pain, or SOB. Cough is dry. He c/o generalized HA 2/2 coughing only. Denies known pulmonary history, patient quit smoking 35 years ago. Patient has tried Vicks nighttime, denies using other OTC medication. Tolerating PO. There are no other complaints, changes, or physical findings at this time. PCP: Ernst Phillips MD    No current facility-administered medications on file prior to encounter.      Current Outpatient Medications on File Prior to Encounter   Medication Sig Dispense Refill    amLODIPine (NORVASC) 10 mg tablet TAKE 1 TABLET BY MOUTH DAILY FOR BLOOD PRESSURE 90 Tab 0    diclofenac EC (VOLTAREN) 75 mg EC tablet TAKE 1 TABLET BY MOUTH TWICE DAILY FOR ARTHRITIS 60 Tab 0    simvastatin (ZOCOR) 10 mg tablet TAKE 1 TABLET BY MOUTH EVERY NIGHT AT BEDTIME FOR CHOLESTEROL 90 Tab 0    potassium chloride (K-DUR, KLOR-CON) 20 mEq tablet TAKE 1 TABLET BY MOUTH TWICE DAILY 60 Tab 0    simvastatin (ZOCOR) 20 mg tablet TAKE 1 TABLET BY MOUTH EVERY NIGHT AT BEDTIME FOR CHOLESTEROL 90 Tab 0    fluticasone (FLONASE) 50 mcg/actuation nasal spray 2 sprays each nostril daily 1 Bottle 12    hydroCHLOROthiazide (HYDRODIURIL) 25 mg tablet TAKE 1 TABLET BY MOUTH DAILY FOR BLOOD PRESSURE 90 Tab 3       Past History     Past Medical History:  Past Medical History:   Diagnosis Date    Arthritis     High cholesterol 3/18/2010    HTN (hypertension) 3/18/2010    Insomnia     EYES PUFFY & BLOODSHOT ON AWAKENING AFTER FITFUL NIGHT 13    Other and unspecified symptoms and signs involving general sensations and perceptions     5-6 [address] Other ill-defined conditions(226.55)     R FEMUR FRACTURE AGE 10-PINS & TRACTION USED    Panic attacks 3/18/2010    Unspecified adverse effect of anesthesia     PT STATES HE DID NOT SLEEP DURING COLONOSCOPY-MEDS DID NOT SEDATE       Past Surgical History:  Past Surgical History:   Procedure Laterality Date    COLONOSCOPY       linda- normal    HX ANKLE FRACTURE TX      Right ankle ORIF    HX COLONOSCOPY      - Dr. Kalina Tristan HX HIP REPLACEMENT Right 13       Family History:  Family History   Problem Relation Age of Onset    Emphysema Mother     Heart Attack Father     Hypertension Brother     Stroke Brother     Hypertension Brother     Stroke Brother        Social History:  Social History     Tobacco Use    Smoking status: Former     Packs/day: 1.00     Years: 20.00     Pack years: 20.00     Types: Cigarettes     Quit date: 1991     Years since quittin.4    Smokeless tobacco: Never    Tobacco comments:        Substance Use Topics    Alcohol use: Yes     Comment: occasionally    Drug use: No       Allergies: Allergies   Allergen Reactions    Naproxen Other (comments)     nosebleeds    Other Medication Other (comments)     oxepam  Patient states he doesn't know what this is.  Tylenol [Acetaminophen] Other (comments)     Makes \"nose bleed\" and \"throat dry\"         Review of Systems   Review of Systems   Constitutional:  Negative for fever. HENT:  Positive for rhinorrhea. Negative for sore throat. Eyes:  Negative for pain. Respiratory:  Positive for cough. Negative for shortness of breath and wheezing. Cardiovascular:  Negative for chest pain and leg swelling. Gastrointestinal:  Negative for abdominal pain, diarrhea, nausea and vomiting.    Genitourinary:  Negative for flank pain.   Musculoskeletal:  Negative for back pain. Skin:  Negative for rash. Neurological:  Positive for headaches (secondary to cough). Negative for dizziness. All other systems reviewed and are negative. Physical Exam   Physical Exam  Vitals and nursing note reviewed. Constitutional:       General: He is not in acute distress. Appearance: He is well-developed. He is not toxic-appearing. HENT:      Head: Normocephalic and atraumatic. No right periorbital erythema or left periorbital erythema. Right Ear: External ear normal.      Left Ear: External ear normal.      Nose: Congestion present. Mouth/Throat:      Mouth: Mucous membranes are moist.      Pharynx: Oropharynx is clear. Uvula midline. No pharyngeal swelling, oropharyngeal exudate or posterior oropharyngeal erythema. Eyes:      General: No scleral icterus. Right eye: No discharge. Left eye: No discharge. Conjunctiva/sclera: Conjunctivae normal.      Pupils: Pupils are equal, round, and reactive to light. Cardiovascular:      Rate and Rhythm: Normal rate and regular rhythm. Pulmonary:      Effort: Pulmonary effort is normal. No respiratory distress. Breath sounds: Normal breath sounds. No wheezing, rhonchi or rales. Abdominal:      Palpations: Abdomen is soft. Tenderness: There is no abdominal tenderness. Musculoskeletal:         General: Normal range of motion. Cervical back: Normal range of motion. No rigidity. Right lower leg: No edema. Left lower leg: No edema. Skin:     General: Skin is warm and dry. Findings: No rash. Neurological:      General: No focal deficit present. Mental Status: He is alert and oriented to person, place, and time. Cranial Nerves: No cranial nerve deficit. Sensory: No sensory deficit.    Psychiatric:         Mood and Affect: Mood normal.         Speech: Speech normal.         Behavior: Behavior normal.       Diagnostic Study Results     Labs -     Recent Results (from the past 12 hour(s))   INFLUENZA A+B VIRAL AGS    Collection Time: 11/26/22  4:30 PM   Result Value Ref Range    Influenza A Antigen Negative NEG      Influenza B Antigen Negative NEG     COVID-19 RAPID TEST    Collection Time: 11/26/22  4:30 PM   Result Value Ref Range    Specimen source Nasopharyngeal      COVID-19 rapid test Not detected NOTD         Radiologic Studies -   No orders to display     CT Results  (Last 48 hours)      None          CXR Results  (Last 48 hours)      None            Medical Decision Making   I am the first provider for this patient. I reviewed the vital signs, available nursing notes, past medical history, past surgical history, family history and social history. Vital Signs-Reviewed the patient's vital signs. Patient Vitals for the past 12 hrs:   Temp Pulse Resp BP SpO2   11/26/22 1627 98.2 °F (36.8 °C) 92 18 (!) 172/80 99 %       Records Reviewed: Nursing Notes and Old Medical Records    Provider Notes (Medical Decision Making):   Patient presents afebrile in no acute distress. Oxygen saturation approaches 100%. Blood pressure is elevated today however, patient has a history of high blood pressure patient tested for COVID and flu in the emergency department today, all testing negative. Patient to be prescribed benzonatate for cough relief and a robitussin with codeine for nighttime symptoms. Patient encouraged to follow up with PCP. Patient to return to ED should symptoms persist or new symptoms develop. ED Course:   Initial assessment performed. The patients presenting problems have been discussed, and they are in agreement with the care plan formulated and outlined with them. I have encouraged them to ask questions as they arise throughout their visit. Disposition:  Discharge    DISCHARGE PLAN:  1.    Discharge Medication List as of 11/26/2022  8:19 PM        START taking these medications    Details   benzonatate (Tessalon Perles) 100 mg capsule Take 1 Capsule by mouth three (3) times daily as needed for Cough for up to 7 days. , Normal, Disp-30 Capsule, R-0      guaiFENesin-codeine (ROBITUSSIN AC) 100-10 mg/5 mL solution Take 5 mL by mouth three (3) times daily as needed for Cough for up to 5 days. Max Daily Amount: 15 mL., Normal, Disp-75 mL, R-0           CONTINUE these medications which have NOT CHANGED    Details   amLODIPine (NORVASC) 10 mg tablet TAKE 1 TABLET BY MOUTH DAILY FOR BLOOD PRESSURE, Normal, Disp-90 Tab, R-0      diclofenac EC (VOLTAREN) 75 mg EC tablet TAKE 1 TABLET BY MOUTH TWICE DAILY FOR ARTHRITIS, Normal, Disp-60 Tab, R-0      !! simvastatin (ZOCOR) 10 mg tablet TAKE 1 TABLET BY MOUTH EVERY NIGHT AT BEDTIME FOR CHOLESTEROL, Normal, Disp-90 Tab, R-0      potassium chloride (K-DUR, KLOR-CON) 20 mEq tablet TAKE 1 TABLET BY MOUTH TWICE DAILY, Normal, Disp-60 Tab, R-0      !! simvastatin (ZOCOR) 20 mg tablet TAKE 1 TABLET BY MOUTH EVERY NIGHT AT BEDTIME FOR CHOLESTEROL, Normal, Disp-90 Tab, R-0      fluticasone (FLONASE) 50 mcg/actuation nasal spray 2 sprays each nostril daily, Normal, Disp-1 Bottle, R-12      hydroCHLOROthiazide (HYDRODIURIL) 25 mg tablet TAKE 1 TABLET BY MOUTH DAILY FOR BLOOD PRESSURE, Normal, Disp-90 Tab, R-3       !! - Potential duplicate medications found. Please discuss with provider. 2.   Follow-up Information       Follow up With Specialties Details Why Contact Info    [facility] Emergency Medicine  As needed, If symptoms worsen [address] [facility] [address]    Channing Alva MD Family Medicine Call  For follow up [address] [phone]            3. Return to ED if worse     Diagnosis     Clinical Impression:   1. Acute bronchitis, unspecified organism    2. Upper respiratory tract infection, unspecified type    3.  Asymptomatic hypertension      This note was completed with assistance from MCKAY brice Dolores Shanks. Attestations:    MCKAY Bautista        Please note that this dictation was completed with [employer], the computer voice recognition software. Quite often unanticipated grammatical, syntax, homophones, and other interpretive errors are inadvertently transcribed by the computer software. Please disregard these errors. Please excuse any errors that have escaped final proofreading. Thank you.

## 2023-06-17 ENCOUNTER — APPOINTMENT (OUTPATIENT)
Facility: HOSPITAL | Age: 68
End: 2023-06-17
Payer: COMMERCIAL

## 2023-06-17 ENCOUNTER — HOSPITAL ENCOUNTER (EMERGENCY)
Facility: HOSPITAL | Age: 68
Discharge: HOME | End: 2023-06-17
Attending: EMERGENCY MEDICINE
Payer: COMMERCIAL

## 2023-06-17 VITALS
HEART RATE: 63 BPM | DIASTOLIC BLOOD PRESSURE: 81 MMHG | SYSTOLIC BLOOD PRESSURE: 159 MMHG | RESPIRATION RATE: 18 BRPM | OXYGEN SATURATION: 96 % | WEIGHT: 202.16 LBS | BODY MASS INDEX: 34.7 KG/M2 | TEMPERATURE: 98.4 F

## 2023-06-17 DIAGNOSIS — R05.1 ACUTE COUGH: Primary | ICD-10-CM

## 2023-06-17 DIAGNOSIS — J06.9 VIRAL URI: ICD-10-CM

## 2023-06-17 PROCEDURE — 99283 EMERGENCY DEPT VISIT LOW MDM: CPT

## 2023-06-17 PROCEDURE — 6370000000 HC RX 637 (ALT 250 FOR IP): Performed by: EMERGENCY MEDICINE

## 2023-06-17 PROCEDURE — 71046 X-RAY EXAM CHEST 2 VIEWS: CPT

## 2023-06-17 RX ORDER — BENZONATATE 100 MG/1
100 CAPSULE ORAL 3 TIMES DAILY PRN
Qty: 30 CAPSULE | Refills: 0 | Status: CANCELLED | OUTPATIENT
Start: 2023-06-17 | End: 2023-06-27

## 2023-06-17 RX ORDER — BENZONATATE 100 MG/1
100 CAPSULE ORAL ONCE
Status: COMPLETED | OUTPATIENT
Start: 2023-06-17 | End: 2023-06-17

## 2023-06-17 RX ADMIN — BENZONATATE 100 MG: 100 CAPSULE ORAL at 04:41

## 2023-06-17 NOTE — ED PROVIDER NOTES
to ER should their symptoms worsen. PATIENT REFERRED TO:  No follow-up provider specified. DISCHARGE MEDICATIONS:     Medication List        ASK your doctor about these medications      amLODIPine 10 MG tablet  Commonly known as: NORVASC     diclofenac 75 MG EC tablet  Commonly known as: VOLTAREN     fluticasone 50 MCG/ACT nasal spray  Commonly known as: FLONASE     hydroCHLOROthiazide 25 MG tablet  Commonly known as: HYDRODIURIL     potassium chloride 20 MEQ extended release tablet  Commonly known as: KLOR-CON M     * simvastatin 20 MG tablet  Commonly known as: ZOCOR     * simvastatin 10 MG tablet  Commonly known as: ZOCOR           * This list has 2 medication(s) that are the same as other medications prescribed for you. Read the directions carefully, and ask your doctor or other care provider to review them with you. DISCONTINUED MEDICATIONS:  Discharge Medication List as of 6/17/2023  5:43 AM          I am the Primary Clinician of Record. Princess Marlo DO (electronically signed)    (Please note that parts of this dictation were completed with voice recognition software. Quite often unanticipated grammatical, syntax, homophones, and other interpretive errors are inadvertently transcribed by the computer software. Please disregards these errors.  Please excuse any errors that have escaped final proofreading.)         Princess Marlo DO  06/17/23 6097

## 2023-06-17 NOTE — ED NOTES
Patient left w/o D/C papers.  Did not want to wait anymore     Merlin Aldridge, LUCITA  06/17/23 4657